# Patient Record
Sex: MALE | Race: WHITE
[De-identification: names, ages, dates, MRNs, and addresses within clinical notes are randomized per-mention and may not be internally consistent; named-entity substitution may affect disease eponyms.]

---

## 2018-05-31 ENCOUNTER — HOSPITAL ENCOUNTER (INPATIENT)
Dept: HOSPITAL 47 - EC | Age: 78
LOS: 2 days | Discharge: HOME | DRG: 684 | End: 2018-06-02
Attending: INTERNAL MEDICINE | Admitting: INTERNAL MEDICINE
Payer: MEDICARE

## 2018-05-31 VITALS — BODY MASS INDEX: 24.7 KG/M2

## 2018-05-31 DIAGNOSIS — J44.9: ICD-10-CM

## 2018-05-31 DIAGNOSIS — N17.0: Primary | ICD-10-CM

## 2018-05-31 DIAGNOSIS — M10.071: ICD-10-CM

## 2018-05-31 DIAGNOSIS — F17.200: ICD-10-CM

## 2018-05-31 DIAGNOSIS — Z71.6: ICD-10-CM

## 2018-05-31 DIAGNOSIS — Z80.9: ICD-10-CM

## 2018-05-31 DIAGNOSIS — M10.072: ICD-10-CM

## 2018-05-31 DIAGNOSIS — I48.91: ICD-10-CM

## 2018-05-31 DIAGNOSIS — I10: ICD-10-CM

## 2018-05-31 DIAGNOSIS — T50.2X5A: ICD-10-CM

## 2018-05-31 DIAGNOSIS — N40.0: ICD-10-CM

## 2018-05-31 DIAGNOSIS — E78.5: ICD-10-CM

## 2018-05-31 LAB
ALBUMIN SERPL-MCNC: 3.9 G/DL (ref 3.5–5)
ALP SERPL-CCNC: 70 U/L (ref 38–126)
ALT SERPL-CCNC: 24 U/L (ref 21–72)
ANION GAP SERPL CALC-SCNC: 14 MMOL/L
APTT BLD: 24.8 SEC (ref 22–30)
AST SERPL-CCNC: 28 U/L (ref 17–59)
BASOPHILS # BLD AUTO: 0 K/UL (ref 0–0.2)
BASOPHILS NFR BLD AUTO: 0 %
BUN SERPL-SCNC: 44 MG/DL (ref 9–20)
CALCIUM SPEC-MCNC: 8.9 MG/DL (ref 8.4–10.2)
CHLORIDE SERPL-SCNC: 97 MMOL/L (ref 98–107)
CK SERPL-CCNC: 321 U/L (ref 55–170)
CO2 SERPL-SCNC: 25 MMOL/L (ref 22–30)
D DIMER PPP FEU-MCNC: 2.41 MG/L FEU (ref ?–0.6)
EOSINOPHIL # BLD AUTO: 0.3 K/UL (ref 0–0.7)
EOSINOPHIL NFR BLD AUTO: 3 %
ERYTHROCYTE [DISTWIDTH] IN BLOOD BY AUTOMATED COUNT: 5.12 M/UL (ref 4.3–5.9)
ERYTHROCYTE [DISTWIDTH] IN BLOOD: 14 % (ref 11.5–15.5)
GLUCOSE SERPL-MCNC: 110 MG/DL (ref 74–99)
HCT VFR BLD AUTO: 44.7 % (ref 39–53)
HGB BLD-MCNC: 15.2 GM/DL (ref 13–17.5)
INR PPP: 1 (ref ?–1.2)
LYMPHOCYTES # SPEC AUTO: 1.6 K/UL (ref 1–4.8)
LYMPHOCYTES NFR SPEC AUTO: 17 %
MAGNESIUM SPEC-SCNC: 2.1 MG/DL (ref 1.6–2.3)
MCH RBC QN AUTO: 29.7 PG (ref 25–35)
MCHC RBC AUTO-ENTMCNC: 34.1 G/DL (ref 31–37)
MCV RBC AUTO: 87.2 FL (ref 80–100)
MONOCYTES # BLD AUTO: 0.9 K/UL (ref 0–1)
MONOCYTES NFR BLD AUTO: 9 %
NEUTROPHILS # BLD AUTO: 6.2 K/UL (ref 1.3–7.7)
NEUTROPHILS NFR BLD AUTO: 68 %
PLATELET # BLD AUTO: 193 K/UL (ref 150–450)
POTASSIUM SERPL-SCNC: 3.9 MMOL/L (ref 3.5–5.1)
PROT SERPL-MCNC: 6.5 G/DL (ref 6.3–8.2)
PT BLD: 10 SEC (ref 9–12)
SODIUM SERPL-SCNC: 136 MMOL/L (ref 137–145)
TROPONIN I SERPL-MCNC: <0.012 NG/ML (ref 0–0.03)
WBC # BLD AUTO: 9.1 K/UL (ref 3.8–10.6)

## 2018-05-31 PROCEDURE — 85610 PROTHROMBIN TIME: CPT

## 2018-05-31 PROCEDURE — 76770 US EXAM ABDO BACK WALL COMP: CPT

## 2018-05-31 PROCEDURE — 80053 COMPREHEN METABOLIC PANEL: CPT

## 2018-05-31 PROCEDURE — 71046 X-RAY EXAM CHEST 2 VIEWS: CPT

## 2018-05-31 PROCEDURE — 85730 THROMBOPLASTIN TIME PARTIAL: CPT

## 2018-05-31 PROCEDURE — 84484 ASSAY OF TROPONIN QUANT: CPT

## 2018-05-31 PROCEDURE — 83880 ASSAY OF NATRIURETIC PEPTIDE: CPT

## 2018-05-31 PROCEDURE — 82550 ASSAY OF CK (CPK): CPT

## 2018-05-31 PROCEDURE — 85027 COMPLETE CBC AUTOMATED: CPT

## 2018-05-31 PROCEDURE — 81003 URINALYSIS AUTO W/O SCOPE: CPT

## 2018-05-31 PROCEDURE — 99285 EMERGENCY DEPT VISIT HI MDM: CPT

## 2018-05-31 PROCEDURE — 94760 N-INVAS EAR/PLS OXIMETRY 1: CPT

## 2018-05-31 PROCEDURE — 85025 COMPLETE CBC W/AUTO DIFF WBC: CPT

## 2018-05-31 PROCEDURE — 36415 COLL VENOUS BLD VENIPUNCTURE: CPT

## 2018-05-31 PROCEDURE — 84550 ASSAY OF BLOOD/URIC ACID: CPT

## 2018-05-31 PROCEDURE — 85379 FIBRIN DEGRADATION QUANT: CPT

## 2018-05-31 PROCEDURE — 93005 ELECTROCARDIOGRAM TRACING: CPT

## 2018-05-31 PROCEDURE — 78582 LUNG VENTILAT&PERFUS IMAGING: CPT

## 2018-05-31 PROCEDURE — 82553 CREATINE MB FRACTION: CPT

## 2018-05-31 PROCEDURE — 83735 ASSAY OF MAGNESIUM: CPT

## 2018-05-31 RX ADMIN — CEFAZOLIN SCH MLS/HR: 330 INJECTION, POWDER, FOR SOLUTION INTRAMUSCULAR; INTRAVENOUS at 23:05

## 2018-05-31 NOTE — ED
Medical Decision Making





- Medical Decision Making





Additional information is currently being treated for acute gout attack is on 

medication.





- Lab Data


Result diagrams: 


 05/31/18 15:11





 05/31/18 15:11





 Lab Results











  05/31/18 05/31/18 05/31/18 Range/Units





  15:11 15:11 15:11 


 


WBC   9.1   (3.8-10.6)  k/uL


 


RBC   5.12   (4.30-5.90)  m/uL


 


Hgb   15.2   (13.0-17.5)  gm/dL


 


Hct   44.7   (39.0-53.0)  %


 


MCV   87.2   (80.0-100.0)  fL


 


MCH   29.7   (25.0-35.0)  pg


 


MCHC   34.1   (31.0-37.0)  g/dL


 


RDW   14.0   (11.5-15.5)  %


 


Plt Count   193   (150-450)  k/uL


 


Neutrophils %   68   %


 


Lymphocytes %   17   %


 


Monocytes %   9   %


 


Eosinophils %   3   %


 


Basophils %   0   %


 


Neutrophils #   6.2   (1.3-7.7)  k/uL


 


Lymphocytes #   1.6   (1.0-4.8)  k/uL


 


Monocytes #   0.9   (0-1.0)  k/uL


 


Eosinophils #   0.3   (0-0.7)  k/uL


 


Basophils #   0.0   (0-0.2)  k/uL


 


PT     (9.0-12.0)  sec


 


INR     (<1.2)  


 


APTT     (22.0-30.0)  sec


 


D-Dimer     (<0.60)  mg/L FEU


 


Sodium    136 L  (137-145)  mmol/L


 


Potassium    3.9  (3.5-5.1)  mmol/L


 


Chloride    97 L  ()  mmol/L


 


Carbon Dioxide    25  (22-30)  mmol/L


 


Anion Gap    14  mmol/L


 


BUN    44 H  (9-20)  mg/dL


 


Creatinine    3.00 H  (0.66-1.25)  mg/dL


 


Est GFR (CKD-EPI)AfAm    22  (>60 ml/min/1.73 sqM)  


 


Est GFR (CKD-EPI)NonAf    19  (>60 ml/min/1.73 sqM)  


 


Glucose    110 H  (74-99)  mg/dL


 


Calcium    8.9  (8.4-10.2)  mg/dL


 


Magnesium    2.1  (1.6-2.3)  mg/dL


 


Total Bilirubin    0.6  (0.2-1.3)  mg/dL


 


AST    28  (17-59)  U/L


 


ALT    24  (21-72)  U/L


 


Alkaline Phosphatase    70  ()  U/L


 


Total Creatine Kinase  321 H    ()  U/L


 


CK-MB (CK-2)  7.0 H*    (0.0-2.4)  ng/mL


 


CK-MB (CK-2) Rel Index  2.2    


 


Troponin I  <0.012    (0.000-0.034)  ng/mL


 


NT-Pro-B Natriuret Pep     pg/mL


 


Total Protein    6.5  (6.3-8.2)  g/dL


 


Albumin    3.9  (3.5-5.0)  g/dL














  05/31/18 05/31/18 Range/Units





  15:11 15:11 


 


WBC    (3.8-10.6)  k/uL


 


RBC    (4.30-5.90)  m/uL


 


Hgb    (13.0-17.5)  gm/dL


 


Hct    (39.0-53.0)  %


 


MCV    (80.0-100.0)  fL


 


MCH    (25.0-35.0)  pg


 


MCHC    (31.0-37.0)  g/dL


 


RDW    (11.5-15.5)  %


 


Plt Count    (150-450)  k/uL


 


Neutrophils %    %


 


Lymphocytes %    %


 


Monocytes %    %


 


Eosinophils %    %


 


Basophils %    %


 


Neutrophils #    (1.3-7.7)  k/uL


 


Lymphocytes #    (1.0-4.8)  k/uL


 


Monocytes #    (0-1.0)  k/uL


 


Eosinophils #    (0-0.7)  k/uL


 


Basophils #    (0-0.2)  k/uL


 


PT  10.0   (9.0-12.0)  sec


 


INR  1.0   (<1.2)  


 


APTT  24.8   (22.0-30.0)  sec


 


D-Dimer  2.41 H   (<0.60)  mg/L FEU


 


Sodium    (137-145)  mmol/L


 


Potassium    (3.5-5.1)  mmol/L


 


Chloride    ()  mmol/L


 


Carbon Dioxide    (22-30)  mmol/L


 


Anion Gap    mmol/L


 


BUN    (9-20)  mg/dL


 


Creatinine    (0.66-1.25)  mg/dL


 


Est GFR (CKD-EPI)AfAm    (>60 ml/min/1.73 sqM)  


 


Est GFR (CKD-EPI)NonAf    (>60 ml/min/1.73 sqM)  


 


Glucose    (74-99)  mg/dL


 


Calcium    (8.4-10.2)  mg/dL


 


Magnesium    (1.6-2.3)  mg/dL


 


Total Bilirubin    (0.2-1.3)  mg/dL


 


AST    (17-59)  U/L


 


ALT    (21-72)  U/L


 


Alkaline Phosphatase    ()  U/L


 


Total Creatine Kinase    ()  U/L


 


CK-MB (CK-2)    (0.0-2.4)  ng/mL


 


CK-MB (CK-2) Rel Index    


 


Troponin I    (0.000-0.034)  ng/mL


 


NT-Pro-B Natriuret Pep   2270  pg/mL


 


Total Protein    (6.3-8.2)  g/dL


 


Albumin    (3.5-5.0)  g/dL














Disposition


Clinical Impression: 


 Acute renal failure (ARF), Hypotensive episode, Dyspnea





Disposition: ADMITTED AS IP TO THIS HOSP


Condition: Stable


Referrals: 


[facility] [Primary Care Provider] - 1-2 days

## 2018-05-31 NOTE — ED
General Adult HPI





- General


Source: patient, EMS, RN notes reviewed, old records reviewed


Mode of arrival: EMS


Limitations: no limitations





<Kurt Huber - Last Filed: 05/31/18 17:08>





<Kurt Rodriguez - Last Filed: 05/31/18 22:36>





- General


Chief complaint: Shortness of Breath


Stated complaint: DENISHA, AFIB


Time Seen by Provider: 05/31/18 14:58





- History of Present Illness


Initial comments: 





78-year-old male presenting for episode of dyspnea.  Patient was transported by 

EMS.  He had a single episode of severe dyspnea which lasted less than a 

minute.  He states he could not take a breath.  He denies any chest pain with 

this.  No neck pain or arm pain.  No abdominal pain.  No diaphoresis or 

vomiting.  Episode resolved without treatment.  EMS reports stable vitals 

during transport.  He did have a second episode while being transported by EMS 

this again lasted seconds and was not accompanied by pain.  EMS report no 

change in vitals during the episode.  Patient has no known history of coronary 

artery disease.  No history of DVT or PE.   (Kurt Huber)





- Related Data


 Home Medications











 Medication  Instructions  Recorded  Confirmed


 


Allopurinol [Zyloprim] 300 mg PO DAILY 05/31/18 05/31/18


 


Atenolol 25 mg PO BID 05/31/18 05/31/18


 


Colchicine 0.6 mg PO TID PRN 05/31/18 05/31/18


 


Ergocalciferol [Vitamin D2] 50,000 unit PO SA 05/31/18 05/31/18


 


Hydrochlorothiazide [Hydrodiuril] 50 mg PO DAILY 05/31/18 05/31/18


 


Lisinopril [Zestril] 5 mg PO DAILY 05/31/18 05/31/18


 


Ranitidine HCl 150 mg PO BID 05/31/18 05/31/18


 


Tamsulosin HCl [Flomax] 0.4 mg PO DAILY 05/31/18 05/31/18


 


amLODIPine BESYLATE [Norvasc] 10 mg PO DAILY 05/31/18 05/31/18


 


traMADol HCL [Ultram] 50 mg PO Q6HR PRN 05/31/18 05/31/18











 Allergies











Allergy/AdvReac Type Severity Reaction Status Date / Time


 


No Known Allergies Allergy   Verified 05/31/18 15:33














Review of Systems


ROS Other: All systems not noted in ROS Statement are negative.





<FroydominiqueKurt MINNIE - Last Filed: 05/31/18 17:08>


ROS Other: All systems not noted in ROS Statement are negative.





<Kurt Rodriguez - Last Filed: 05/31/18 22:36>


ROS Statement: 


Those systems with pertinent positive or pertinent negative responses have been 

documented in the HPI.








Past Medical History


Past Medical History: Hyperlipidemia, Hypertension, Prostate Disorder


History of Any Multi-Drug Resistant Organisms: None Reported


Past Surgical History: Back Surgery


Smoking Status: Current every day smoker


Past Alcohol Use History: None Reported


Past Drug Use History: None Reported





<FroybrunoshantanuKurt MINNIE - Last Filed: 05/31/18 17:08>





General Exam


Limitations: no limitations


General appearance: alert, in no apparent distress


Head exam: Present: atraumatic, normocephalic


Eye exam: Present: normal appearance, PERRL


ENT exam: Present: normal exam


Neck exam: Present: normal inspection.  Absent: tenderness, meningismus


Respiratory exam: Present: normal lung sounds bilaterally.  Absent: respiratory 

distress, wheezes, rales


Cardiovascular Exam: Present: regular rate, normal rhythm


GI/Abdominal exam: Present: soft.  Absent: distended, tenderness


Extremities exam: Present: normal inspection, normal capillary refill.  Absent: 

pedal edema, calf tenderness


Neurological exam: Present: alert, oriented X3.  Absent: motor sensory deficit


Psychiatric exam: Present: normal affect, normal mood


Skin exam: Present: warm, dry, intact.  Absent: cyanosis, diaphoretic





<FroyKurt fox N - Last Filed: 05/31/18 17:08>





Course





<FroyKurt fox N - Last Filed: 05/31/18 17:08>





<Kurt Rodriguez - Last Filed: 05/31/18 22:36>


 Vital Signs











  05/31/18 05/31/18 05/31/18





  14:58 16:05 17:35


 


Temperature 97.3 F L  


 


Pulse Rate 75 79 65


 


Respiratory 18 18 18





Rate   


 


Blood Pressure 93/55 109/52 93/55


 


O2 Sat by Pulse 93 L 96 98





Oximetry   














  05/31/18 05/31/18 05/31/18





  18:12 19:14 21:17


 


Temperature  97.9 F 


 


Pulse Rate 59 L 58 L 


 


Respiratory 18 16 18





Rate   


 


Blood Pressure 98/57 114/51 


 


O2 Sat by Pulse 98 97 





Oximetry   














  05/31/18





  21:29


 


Temperature 


 


Pulse Rate 72


 


Respiratory 16





Rate 


 


Blood Pressure 


 


O2 Sat by Pulse 96





Oximetry 














- Reevaluation(s)


Reevaluation #1: 





05/31/18 22:32


I did reevaluate the patient several occasions the VQ scan was negative for 

evidence of PE.  I did discuss the findings with the patient has wife patient 

will be admitted he has have evidence of renal failure hypotension and episodes 

of dyspnea (Kurt Rodriguez)





EKG Findings





- EKG Comments:


EKG Findings:: EKG: Atrial fibrillation, left axis deviation, incomplete right 

bundle, rate of 76, QRS duration 108, 





<Kurt Huber - Last Filed: 05/31/18 17:08>





Medical Decision Making





- Lab Data


Result diagrams: 


 05/31/18 15:11





 05/31/18 15:11





<Kurt Huber - Last Filed: 05/31/18 17:08>





- Lab Data


Result diagrams: 


 05/31/18 15:11





 05/31/18 15:11





<Kurt Rodriguez - Last Filed: 05/31/18 22:36>





- Medical Decision Making





78-year-old male presenting with episodic dyspnea.  Labs reveal normal white 

blood cell count, stable hemoglobin, d-dimer is elevated at 2.4, creatinine is 

also elevated 3.1 with no known baseline.  Patient will receive a VQ scan for 

evaluation of pulmonary embolism given the elevated d-dimer.  Patient's care 

will be signed out to Dr. Rodriguez at shift change awaiting VQ scan and final 

disposition. (Kurt Huber)





- Lab Data


 Lab Results











  05/31/18 05/31/18 05/31/18 Range/Units





  15:11 15:11 15:11 


 


WBC   9.1   (3.8-10.6)  k/uL


 


RBC   5.12   (4.30-5.90)  m/uL


 


Hgb   15.2   (13.0-17.5)  gm/dL


 


Hct   44.7   (39.0-53.0)  %


 


MCV   87.2   (80.0-100.0)  fL


 


MCH   29.7   (25.0-35.0)  pg


 


MCHC   34.1   (31.0-37.0)  g/dL


 


RDW   14.0   (11.5-15.5)  %


 


Plt Count   193   (150-450)  k/uL


 


Neutrophils %   68   %


 


Lymphocytes %   17   %


 


Monocytes %   9   %


 


Eosinophils %   3   %


 


Basophils %   0   %


 


Neutrophils #   6.2   (1.3-7.7)  k/uL


 


Lymphocytes #   1.6   (1.0-4.8)  k/uL


 


Monocytes #   0.9   (0-1.0)  k/uL


 


Eosinophils #   0.3   (0-0.7)  k/uL


 


Basophils #   0.0   (0-0.2)  k/uL


 


PT     (9.0-12.0)  sec


 


INR     (<1.2)  


 


APTT     (22.0-30.0)  sec


 


D-Dimer     (<0.60)  mg/L FEU


 


Sodium    136 L  (137-145)  mmol/L


 


Potassium    3.9  (3.5-5.1)  mmol/L


 


Chloride    97 L  ()  mmol/L


 


Carbon Dioxide    25  (22-30)  mmol/L


 


Anion Gap    14  mmol/L


 


BUN    44 H  (9-20)  mg/dL


 


Creatinine    3.00 H  (0.66-1.25)  mg/dL


 


Est GFR (CKD-EPI)AfAm    22  (>60 ml/min/1.73 sqM)  


 


Est GFR (CKD-EPI)NonAf    19  (>60 ml/min/1.73 sqM)  


 


Glucose    110 H  (74-99)  mg/dL


 


Calcium    8.9  (8.4-10.2)  mg/dL


 


Magnesium    2.1  (1.6-2.3)  mg/dL


 


Total Bilirubin    0.6  (0.2-1.3)  mg/dL


 


AST    28  (17-59)  U/L


 


ALT    24  (21-72)  U/L


 


Alkaline Phosphatase    70  ()  U/L


 


Total Creatine Kinase  321 H    ()  U/L


 


CK-MB (CK-2)  7.0 H*    (0.0-2.4)  ng/mL


 


CK-MB (CK-2) Rel Index  2.2    


 


Troponin I  <0.012    (0.000-0.034)  ng/mL


 


NT-Pro-B Natriuret Pep     pg/mL


 


Total Protein    6.5  (6.3-8.2)  g/dL


 


Albumin    3.9  (3.5-5.0)  g/dL














  05/31/18 05/31/18 Range/Units





  15:11 15:11 


 


WBC    (3.8-10.6)  k/uL


 


RBC    (4.30-5.90)  m/uL


 


Hgb    (13.0-17.5)  gm/dL


 


Hct    (39.0-53.0)  %


 


MCV    (80.0-100.0)  fL


 


MCH    (25.0-35.0)  pg


 


MCHC    (31.0-37.0)  g/dL


 


RDW    (11.5-15.5)  %


 


Plt Count    (150-450)  k/uL


 


Neutrophils %    %


 


Lymphocytes %    %


 


Monocytes %    %


 


Eosinophils %    %


 


Basophils %    %


 


Neutrophils #    (1.3-7.7)  k/uL


 


Lymphocytes #    (1.0-4.8)  k/uL


 


Monocytes #    (0-1.0)  k/uL


 


Eosinophils #    (0-0.7)  k/uL


 


Basophils #    (0-0.2)  k/uL


 


PT  10.0   (9.0-12.0)  sec


 


INR  1.0   (<1.2)  


 


APTT  24.8   (22.0-30.0)  sec


 


D-Dimer  2.41 H   (<0.60)  mg/L FEU


 


Sodium    (137-145)  mmol/L


 


Potassium    (3.5-5.1)  mmol/L


 


Chloride    ()  mmol/L


 


Carbon Dioxide    (22-30)  mmol/L


 


Anion Gap    mmol/L


 


BUN    (9-20)  mg/dL


 


Creatinine    (0.66-1.25)  mg/dL


 


Est GFR (CKD-EPI)AfAm    (>60 ml/min/1.73 sqM)  


 


Est GFR (CKD-EPI)NonAf    (>60 ml/min/1.73 sqM)  


 


Glucose    (74-99)  mg/dL


 


Calcium    (8.4-10.2)  mg/dL


 


Magnesium    (1.6-2.3)  mg/dL


 


Total Bilirubin    (0.2-1.3)  mg/dL


 


AST    (17-59)  U/L


 


ALT    (21-72)  U/L


 


Alkaline Phosphatase    ()  U/L


 


Total Creatine Kinase    ()  U/L


 


CK-MB (CK-2)    (0.0-2.4)  ng/mL


 


CK-MB (CK-2) Rel Index    


 


Troponin I    (0.000-0.034)  ng/mL


 


NT-Pro-B Natriuret Pep   2270  pg/mL


 


Total Protein    (6.3-8.2)  g/dL


 


Albumin    (3.5-5.0)  g/dL














Disposition





<Kurt Huber - Last Filed: 05/31/18 17:08>





<Kurt Rodriguez - Last Filed: 05/31/18 22:36>


Clinical Impression: 


 Acute renal failure (ARF), Hypotensive episode, Dyspnea





Disposition: ADMITTED AS IP TO THIS HOSP


Condition: Stable


Referrals: 


[facility]inic [Primary Care Provider] - 1-2 days

## 2018-05-31 NOTE — NM
EXAMINATION TYPE: NM pul vent and perfuse

 

DATE OF EXAM: 5/31/2018

 

COMPARISON: Chest radiographs 5/31/2018 at 3:46 PM

 

HISTORY: Dyspnea, elevated d-dimer

 

TECHNIQUE:  Utilizing inhalation of 68.8 mCi Tc 99m DTPA aerosol and intravenous injection of 5.2 mCi
 of Tc 99m MAA, ventilation and perfusion images are acquired post injection in multiple projections.


 

FINDINGS: 

The radiotracer distribution is noted to be mildly heterogeneous throughout the lung on both the vent
ilation multiplanar images and the multiplanar perfusion images, on the left greater than the right. 


 

However, there is no evidence of mismatched defects.

 

IMPRESSION: 

Low probability for the diagnosis of pulmonary embolism.

## 2018-05-31 NOTE — XR
EXAMINATION TYPE: XR chest 2V

 

DATE OF EXAM: 5/31/2018

 

COMPARISON: None

 

HISTORY: Severe shortness of breath and hypertension

 

TECHNIQUE:  Frontal and lateral views of the chest are obtained.

 

FINDINGS:  There is flattening of the diaphragms and pulmonary hyperinflation compatible with underly
ing COPD. Anterior bridging osteophytes are seen that may relate to degenerative change or diffuse id
iopathic skeletal hyperostosis within the thoracic spine. Cardiomediastinal silhouette is upper limit
s of normal size. No focal consolidation or pleural effusion is seen. No pneumothorax. Moderate acrom
ioclavicular arthropathy.

 

IMPRESSION:  No acute cardiopulmonary process. Radiographic sequela of COPD.

## 2018-06-01 VITALS — TEMPERATURE: 97.9 F

## 2018-06-01 VITALS — RESPIRATION RATE: 16 BRPM

## 2018-06-01 LAB
ALBUMIN SERPL-MCNC: 3.8 G/DL (ref 3.5–5)
ALP SERPL-CCNC: 77 U/L (ref 38–126)
ALT SERPL-CCNC: 25 U/L (ref 21–72)
ANION GAP SERPL CALC-SCNC: 16 MMOL/L
AST SERPL-CCNC: 26 U/L (ref 17–59)
BASOPHILS # BLD AUTO: 0 K/UL (ref 0–0.2)
BASOPHILS NFR BLD AUTO: 0 %
BUN SERPL-SCNC: 51 MG/DL (ref 9–20)
CALCIUM SPEC-MCNC: 9 MG/DL (ref 8.4–10.2)
CHLORIDE SERPL-SCNC: 101 MMOL/L (ref 98–107)
CO2 SERPL-SCNC: 23 MMOL/L (ref 22–30)
EOSINOPHIL # BLD AUTO: 0.3 K/UL (ref 0–0.7)
EOSINOPHIL NFR BLD AUTO: 3 %
ERYTHROCYTE [DISTWIDTH] IN BLOOD BY AUTOMATED COUNT: 5.1 M/UL (ref 4.3–5.9)
ERYTHROCYTE [DISTWIDTH] IN BLOOD: 13.8 % (ref 11.5–15.5)
GLUCOSE SERPL-MCNC: 107 MG/DL (ref 74–99)
HCT VFR BLD AUTO: 45.4 % (ref 39–53)
HGB BLD-MCNC: 15.1 GM/DL (ref 13–17.5)
LYMPHOCYTES # SPEC AUTO: 1.8 K/UL (ref 1–4.8)
LYMPHOCYTES NFR SPEC AUTO: 22 %
MCH RBC QN AUTO: 29.5 PG (ref 25–35)
MCHC RBC AUTO-ENTMCNC: 33.2 G/DL (ref 31–37)
MCV RBC AUTO: 89 FL (ref 80–100)
MONOCYTES # BLD AUTO: 0.6 K/UL (ref 0–1)
MONOCYTES NFR BLD AUTO: 7 %
NEUTROPHILS # BLD AUTO: 5.6 K/UL (ref 1.3–7.7)
NEUTROPHILS NFR BLD AUTO: 66 %
PH UR: 5.5 [PH] (ref 5–8)
PLATELET # BLD AUTO: 199 K/UL (ref 150–450)
POTASSIUM SERPL-SCNC: 3.6 MMOL/L (ref 3.5–5.1)
PROT SERPL-MCNC: 6.4 G/DL (ref 6.3–8.2)
SODIUM SERPL-SCNC: 140 MMOL/L (ref 137–145)
SP GR UR: 1 (ref 1–1.03)
URATE SERPL-MCNC: 6.1 MG/DL (ref 3.5–8.5)
UROBILINOGEN UR QL STRIP: <2 MG/DL (ref ?–2)
WBC # BLD AUTO: 8.4 K/UL (ref 3.8–10.6)

## 2018-06-01 RX ADMIN — ATENOLOL SCH MG: 25 TABLET ORAL at 20:21

## 2018-06-01 RX ADMIN — COLCHICINE SCH MG: 0.6 TABLET, FILM COATED ORAL at 16:50

## 2018-06-01 RX ADMIN — CEFAZOLIN SCH MLS/HR: 330 INJECTION, POWDER, FOR SOLUTION INTRAMUSCULAR; INTRAVENOUS at 20:20

## 2018-06-01 RX ADMIN — CEFAZOLIN SCH MLS/HR: 330 INJECTION, POWDER, FOR SOLUTION INTRAMUSCULAR; INTRAVENOUS at 16:50

## 2018-06-01 RX ADMIN — COLCHICINE SCH MG: 0.6 TABLET, FILM COATED ORAL at 08:19

## 2018-06-01 RX ADMIN — ATENOLOL SCH MG: 25 TABLET ORAL at 08:19

## 2018-06-01 RX ADMIN — ALLOPURINOL SCH MG: 300 TABLET ORAL at 08:19

## 2018-06-01 RX ADMIN — TAMSULOSIN HYDROCHLORIDE SCH MG: 0.4 CAPSULE ORAL at 08:19

## 2018-06-01 RX ADMIN — COLCHICINE SCH MG: 0.6 TABLET, FILM COATED ORAL at 20:21

## 2018-06-01 NOTE — US
EXAMINATION TYPE: US kidneys/renal and bladder

 

DATE OF EXAM: 6/1/2018

 

COMPARISON: NONE

 

CLINICAL HISTORY: suze. back pain, no renal history per patient

 

EXAM MEASUREMENTS:

 

Right Kidney:  12.8 x 5.1 x 6.0 cm

Left Kidney: 12.0 x 5.0 x 7.0 cm

 

 

 

Right Kidney: multiple cysts seen, largest = 2.0cm on inferior pole   

Left Kidney: 4.9cm irregular walled cyst with septation noted at inferior pole  

Bladder: wnl

**Bilateral Jets seen: not seen

 

 

There is no evidence for hydronephrosis at this point in time.  No nephrolithiasis is seen.   The uri
nary bladder is anechoic.  Bilateral ureteral jets are not seen.

 

A few small simple appearing cysts are scattered throughout the right kidney on images saved. There i
s lobulated cyst with thin septation or 2 adjacent cysts in the left kidney marked towards end of chelly
dy by technologist.

 

IMPRESSION:

No hydronephrosis is evident bilaterally.

## 2018-06-01 NOTE — P.NPCON
History of Present Illness





- Reason for Consult


acute renal failure





- History of Present Illness





Reason for consultation: Acute kidney injury





History of present illness:


Patient is a 78-year-old male seen in renal consultation for acute kidney 

injury.  Unclear as to what his base in renal function is.  Creatinine was 3.0 

on admission and is down to 2.88 today.  He is currently maintained on normal 

saline at 80 mL an hour.  He was taking hydrochlorothiazide and lisinopril as 

an outpatient.  Lisinopril is held however he did receive hydrochlorothiazide 

this morning.  Patient presented to the hospital with dyspnea.  VQ scan 

revealed low probability of PE.  Chest x-ray revealed no evidence of fluid 

overload.  Patient's also being treated for acute gout which started last 

Sunday.  His oral intake over the last week has been quite poor.  He admits to 

good urine output.  Denies hematuria or dysuria.  Denies chest pain.  Dyspnea 

is overall improved.  Hemodynamically stable.  Denies any family history of 

kidney disease.  Denies vomiting or diarrhea.  He denies use of NSAIDs.





Vital signs are stable.


General: The patient appeared well nourished and normally developed. 


HEENT: Head exam is unremarkable. Neck is without jugular venous distension.


LUNGS: Lungs are clear to auscultation and percussion. Breath sounds decreased.


HEART: Rate and Rhythm are regular. First and second heart sounds normal. No 

murmurs, rubs or gallops. 


ABDOMEN: Abdominal exam reveals normal bowel sounds. Non-tender and non-

distended. No evidence of peritonitis.


EXTREMITITES: No clubbing, cyanosis, or edema.  Erythema and ankles noted.  No 

obvious drainage.





Past Medical History


Past Medical History: Atrial Fibrillation, COPD, Hyperlipidemia, Hypertension, 

Prostate Disorder, Renal Disease


Additional Past Medical History / Comment(s): gout, PSA elevated, acute renal 

failure since 4/2018


History of Any Multi-Drug Resistant Organisms: None Reported


Past Surgical History: Back Surgery


Additional Past Surgical History / Comment(s): 4 back surgeries


Past Anesthesia/Blood Transfusion Reactions: No Reported Reaction


Past Psychological History: No Psychological Hx Reported


Smoking Status: Current every day smoker


Past Alcohol Use History: None Reported


Past Drug Use History: None Reported





- Past Family History


  ** Mother


Family Medical History: Cancer


Additional Family Medical History / Comment(s): cirriosis





  ** Father


Family Medical History: Cancer





Medications and Allergies


 Home Medications











 Medication  Instructions  Recorded  Confirmed  Type


 


Allopurinol [Zyloprim] 300 mg PO DAILY 05/31/18 05/31/18 History


 


Atenolol 25 mg PO BID 05/31/18 05/31/18 History


 


Colchicine 0.6 mg PO TID PRN 05/31/18 05/31/18 History


 


Ergocalciferol [Vitamin D2] 50,000 unit PO SA 05/31/18 05/31/18 History


 


Hydrochlorothiazide [Hydrodiuril] 50 mg PO DAILY 05/31/18 05/31/18 History


 


Lisinopril [Zestril] 5 mg PO DAILY 05/31/18 05/31/18 History


 


Ranitidine HCl 150 mg PO BID 05/31/18 05/31/18 History


 


Tamsulosin HCl [Flomax] 0.4 mg PO DAILY 05/31/18 05/31/18 History


 


amLODIPine BESYLATE [Norvasc] 10 mg PO DAILY 05/31/18 05/31/18 History


 


traMADol HCL [Ultram] 50 mg PO Q6HR PRN 05/31/18 05/31/18 History











 Allergies











Allergy/AdvReac Type Severity Reaction Status Date / Time


 


No Known Allergies Allergy   Verified 05/31/18 15:33














Physical Exam


Vitals: 


 Vital Signs











  Temp Pulse Pulse Pulse Resp BP BP


 


 06/01/18 07:26       


 


 06/01/18 05:47  97.8 F   75   18   130/62


 


 06/01/18 00:00      18  


 


 05/31/18 23:42  97.5 F L    79  18   132/62


 


 05/31/18 23:10  98.3 F  63    16  110/68 


 


 05/31/18 21:29   72    16  


 


 05/31/18 21:17      18  


 


 05/31/18 19:14  97.9 F  58 L    16  114/51 


 


 05/31/18 18:12   59 L    18  98/57 


 


 05/31/18 17:35   65    18  93/55 


 


 05/31/18 16:05   79    18  109/52 


 


 05/31/18 14:58  97.3 F L  75    18  93/55 














  Pulse Ox


 


 06/01/18 07:26  95


 


 06/01/18 05:47  95


 


 06/01/18 00:00 


 


 05/31/18 23:42  98


 


 05/31/18 23:10  96


 


 05/31/18 21:29  96


 


 05/31/18 21:17 


 


 05/31/18 19:14  97


 


 05/31/18 18:12  98


 


 05/31/18 17:35  98


 


 05/31/18 16:05  96


 


 05/31/18 14:58  93 L








 Intake and Output











 05/31/18 06/01/18 06/01/18





 22:59 06:59 14:59


 


Other:   


 


  Voiding Method  Toilet Toilet





  Urinal Urinal


 


  # Voids  2 


 


  Weight  80.5 kg 














Results





- Lab Results


 Most recent lab results











Calcium  9.0 mg/dL (8.4-10.2)   06/01/18  07:50    


 


Magnesium  2.1 mg/dL (1.6-2.3)   05/31/18  15:11    














 06/01/18 07:50





 06/01/18 07:50





Assessment and Plan


Plan: 





Assessment:


1.  Nonoliguric acute kidney injury secondary to ATN secondary to poor oral 

intake and further worsened with the use of lisinopril and diuretics.  

Creatinine was 3 and admission and is down to 2.88 today.  Unknown baseline 

creatinine.


2.  Acute gout maintained on colchicine.


3.  Benign hypertension.  Controlled.


4.  BPH maintained on Flomax.





Plan:


Maintain normal saline at 80 mL an hour.


Continue to hold lisinopril.


I will discontinue hydrochlorothiazide for now.


Check urinalysis.


Check renal ultrasound.





Thank you for the consultation.  I will continue to follow the patient with you 

during his hospital stay.

## 2018-06-01 NOTE — P.HPIM
History of Present Illness


78-year-old gentleman came to ER with the comments of shortness of breath 

patient is found to have elevated creatinine because of which patient was 

admitted.  His baseline creatinine is unknown his creatinine is 3.0 here and 

patient was having gout because of which patient is on colchicine for that.  

Patient did not take any NSAID patient doesn't know if he had chronic kidney 

disease.  VQ scan showed low probability PE chest x-ray did not show any fluid 

overload clinically patient does not appear to have any COPD exacerbation.  

Although patient used to smoke more than a pack a day cutdown to 1 pack a day 

now.  May have had COPD and bronchospasm which is not present now.  Patient's 

creatinine with IV fluids as come down from 3.0-2.88 patient is being continued 

on IV fluids kidney ultrasound is being obtained nephrology was consulted.  He  

denied any dyspnea at this time.  He denied any cough fever chills doesn't have 

any pneumonia as was chest x-ray or clinically





Review of Systems


REVIEW OF SYSTEMS: 


CONSTITUTIONAL: No fever, no malaise, no fatigue. 


HEENT: No recent visual problems or hearing problems. Denied any sore throat. 


CARDIOVASCULAR: No chest pain, orthopnea, PND, no palpitations, no syncope. 


PULMONARY, no hemoptysis. 


GASTROINTESTINAL: No diarrhea, no nausea, no vomiting, no abdominal pain. 

Normoactive bowel sounds. 


NEUROLOGICAL: No headaches, no weakness, no numbness. 


HEMATOLOGICAL: Denies any bleeding or petechiae. 


GENITOURINARY: Denies any burning micturition, frequency, or urgency. 


MUSCULOSKELETAL/RHEUMATOLOGICAL: Denies any joint pain, swelling, or any muscle 

pain. 


ENDOCRINE: Denies any polyuria or polydipsia. 





The rest of the 14-point review of systems is negative.











Past Medical History


Past Medical History: Atrial Fibrillation, COPD, Hyperlipidemia, Hypertension, 

Prostate Disorder, Renal Disease


Additional Past Medical History / Comment(s): gout, PSA elevated, acute renal 

failure since 4/2018


History of Any Multi-Drug Resistant Organisms: None Reported


Past Surgical History: Back Surgery


Additional Past Surgical History / Comment(s): 4 back surgeries


Past Anesthesia/Blood Transfusion Reactions: No Reported Reaction


Past Psychological History: No Psychological Hx Reported


Smoking Status: Current every day smoker


Past Alcohol Use History: None Reported


Past Drug Use History: None Reported





- Past Family History


  ** Mother


Family Medical History: Cancer


Additional Family Medical History / Comment(s): cirriosis





  ** Father


Family Medical History: Cancer





Medications and Allergies


 Home Medications











 Medication  Instructions  Recorded  Confirmed  Type


 


Allopurinol [Zyloprim] 300 mg PO DAILY 05/31/18 05/31/18 History


 


Atenolol 25 mg PO BID 05/31/18 05/31/18 History


 


Colchicine 0.6 mg PO TID PRN 05/31/18 05/31/18 History


 


Ergocalciferol [Vitamin D2] 50,000 unit PO SA 05/31/18 05/31/18 History


 


Hydrochlorothiazide [Hydrodiuril] 50 mg PO DAILY 05/31/18 05/31/18 History


 


Lisinopril [Zestril] 5 mg PO DAILY 05/31/18 05/31/18 History


 


Ranitidine HCl 150 mg PO BID 05/31/18 05/31/18 History


 


Tamsulosin HCl [Flomax] 0.4 mg PO DAILY 05/31/18 05/31/18 History


 


amLODIPine BESYLATE [Norvasc] 10 mg PO DAILY 05/31/18 05/31/18 History


 


traMADol HCL [Ultram] 50 mg PO Q6HR PRN 05/31/18 05/31/18 History











 Allergies











Allergy/AdvReac Type Severity Reaction Status Date / Time


 


No Known Allergies Allergy   Verified 05/31/18 15:33














Physical Exam


Vitals: 


 Vital Signs











  Temp Pulse Pulse Pulse Resp BP BP


 


 06/01/18 14:56  97.8 F    81  16   112/60


 


 06/01/18 07:26       


 


 06/01/18 05:47  97.8 F   75   18   130/62


 


 06/01/18 00:00      18  


 


 05/31/18 23:42  97.5 F L    79  18   132/62


 


 05/31/18 23:10  98.3 F  63    16  110/68 


 


 05/31/18 21:29   72    16  


 


 05/31/18 21:17      18  


 


 05/31/18 19:14  97.9 F  58 L    16  114/51 


 


 05/31/18 18:12   59 L    18  98/57 


 


 05/31/18 17:35   65    18  93/55 














  Pulse Ox


 


 06/01/18 14:56  95


 


 06/01/18 07:26  95


 


 06/01/18 05:47  95


 


 06/01/18 00:00 


 


 05/31/18 23:42  98


 


 05/31/18 23:10  96


 


 05/31/18 21:29  96


 


 05/31/18 21:17 


 


 05/31/18 19:14  97


 


 05/31/18 18:12  98


 


 05/31/18 17:35  98








 Intake and Output











 06/01/18 06/01/18 06/01/18





 06:59 14:59 22:59


 


Intake Total  400 


 


Output Total  700 


 


Balance  -300 


 


Intake:   


 


  Oral  400 


 


Output:   


 


  Urine  700 


 


Other:   


 


  Voiding Method Toilet Toilet Toilet





 Urinal Urinal Urinal


 


  # Voids 2  


 


  Weight 80.5 kg  











PHYSICAL EXAMINATION: 





GENERAL: The patient is alert and oriented x3, not in any acute distress. Well 

developed, well nourished. 


HEENT: Pupils are round and equally reacting to light. EOMI. No scleral 

icterus. No conjunctival pallor. Normocephalic, atraumatic. No pharyngeal 

erythema. No thyromegaly. 


CARDIOVASCULAR: S1 and S2 present. No murmurs, rubs, or gallops. 


PULMONARY: Chest is clear to auscultation, no wheezing or crackles. 


ABDOMEN: Soft, nontender, nondistended, normoactive bowel sounds. No palpable 

organomegaly. 


MUSCULOSKELETAL: No joint swelling or deformity.


EXTREMITIES: No cyanosis, clubbing, or pedal edema.  Patient does have gouty 

arthritis of the left great toe in bilateral ankles pain and tenderness did 

improve


NEUROLOGICAL: Gross neurological examination did not reveal any focal deficits. 


SKIN: No rashes. 











Results


CBC & Chem 7: 


 06/01/18 07:50





 06/01/18 07:50


Labs: 


 Abnormal Lab Results - Last 24 Hours (Table)











  06/01/18 Range/Units





  07:50 


 


BUN  51 H  (9-20)  mg/dL


 


Creatinine  2.88 H  (0.66-1.25)  mg/dL


 


Glucose  107 H  (74-99)  mg/dL














Thrombosis Risk Factor Assmnt





- Choose All That Apply


Any of the Below Risk Factors Present?: Yes


Each Factor Represents 1 point: Swollen legs (current)


Other Risk Factors: No


Thrombosis Risk Factor Assessment Total Risk Factor Score: 1


Thrombosis Risk Factor Assessment Level: Low Risk





Assessment and Plan


Plan: 


-Shortness of breath: Etiology unclear rule out pulmonary embolism may have had 

on and off COPD counseling regarding smoking cessation was provided patient 

will be discharged on albuterol patient may need pulmonary function testing.  

Patient will not require systemic steroids at this time.


-Renal failure: Unsure whether patient has acute renal failure on chronic 

kidney disease patient may have a competent of both IV fluids will be increased 

to 200 mL/h monitor creatinine elevating ultrasound of the kidney and further 

workup for renal failure


-Acute gouty arthritis: Continue with colchicine


-Benign prostatic atrophy


-Rule out pulmonary embolism


-Hypertension: Nephrotoxic agents including ACE inhibitor was discontinued 

patient is being continued on atenolol I'm cutting down amlodipine because of 

low normal blood pressures and hypotension can cause kidney dysfunction, 

hydrochlorothiazide is being discontinued

## 2018-06-02 VITALS — HEART RATE: 81 BPM | SYSTOLIC BLOOD PRESSURE: 128 MMHG | DIASTOLIC BLOOD PRESSURE: 62 MMHG

## 2018-06-02 LAB
ALBUMIN SERPL-MCNC: 3.4 G/DL (ref 3.5–5)
ALP SERPL-CCNC: 71 U/L (ref 38–126)
ALT SERPL-CCNC: 22 U/L (ref 21–72)
ANION GAP SERPL CALC-SCNC: 13 MMOL/L
AST SERPL-CCNC: 20 U/L (ref 17–59)
BUN SERPL-SCNC: 40 MG/DL (ref 9–20)
CALCIUM SPEC-MCNC: 9.1 MG/DL (ref 8.4–10.2)
CHLORIDE SERPL-SCNC: 107 MMOL/L (ref 98–107)
CO2 SERPL-SCNC: 26 MMOL/L (ref 22–30)
ERYTHROCYTE [DISTWIDTH] IN BLOOD BY AUTOMATED COUNT: 4.92 M/UL (ref 4.3–5.9)
ERYTHROCYTE [DISTWIDTH] IN BLOOD: 13.8 % (ref 11.5–15.5)
GLUCOSE SERPL-MCNC: 156 MG/DL (ref 74–99)
HCT VFR BLD AUTO: 44.2 % (ref 39–53)
HGB BLD-MCNC: 14.9 GM/DL (ref 13–17.5)
MCH RBC QN AUTO: 30.3 PG (ref 25–35)
MCHC RBC AUTO-ENTMCNC: 33.7 G/DL (ref 31–37)
MCV RBC AUTO: 89.9 FL (ref 80–100)
PLATELET # BLD AUTO: 178 K/UL (ref 150–450)
POTASSIUM SERPL-SCNC: 3.5 MMOL/L (ref 3.5–5.1)
PROT SERPL-MCNC: 6.1 G/DL (ref 6.3–8.2)
SODIUM SERPL-SCNC: 146 MMOL/L (ref 137–145)
WBC # BLD AUTO: 7.9 K/UL (ref 3.8–10.6)

## 2018-06-02 RX ADMIN — ALLOPURINOL SCH MG: 300 TABLET ORAL at 08:09

## 2018-06-02 RX ADMIN — CEFAZOLIN SCH MLS/HR: 330 INJECTION, POWDER, FOR SOLUTION INTRAMUSCULAR; INTRAVENOUS at 06:06

## 2018-06-02 RX ADMIN — ATENOLOL SCH MG: 25 TABLET ORAL at 08:09

## 2018-06-02 RX ADMIN — TAMSULOSIN HYDROCHLORIDE SCH MG: 0.4 CAPSULE ORAL at 08:10

## 2018-06-02 NOTE — P.DS
Providers


Date of admission: 


05/31/18 22:36





Attending physician: 


Milind Barr





Consults: 





 





05/31/18 22:39


Consult Physician Routine 


   Consulting Provider: Kathy Perla


   Consult Reason/Comments: Renal failure


   Do you want consulting provider notified?: Yes











Primary care physician: 


[facility] Course: 





This is 78-year-old  male patient who presented to the ER with 

bilateral ankle pain and inability to ambulate.  He also had some mild 

shortness of breath.  He was found to have acute kidney failure and was 

followed by nephrology during his hospitalization.  He received colchicine and 

I will Purinol and his gouty flareup subsided.  This may be associated with 

psoriatic arthritis as the patient does have a long-standing history of 

psoriasis.  His creatinine is down to 2.  He will follow-up with his PCP as an 

outpatient.  He's been able to ambulate without any difficulties.  Denies any 

pain.  Blood pressure has stabilized.  He denies shortness of breath.


Patient Condition at Discharge: Stable





Plan - Discharge Summary


Discharge Rx Participant: No


New Discharge Prescriptions: 


New


   Acetaminophen Tab [Tylenol] 650 mg PO Q6HR PRN  tab


     PRN Reason: Mild Pain Or Fever > 100.5


   amLODIPine [Norvasc] 5 mg PO DAILY #30 tab





Continue


   Tamsulosin HCl [Flomax] 0.4 mg PO DAILY


   Ranitidine HCl 150 mg PO BID


   Ergocalciferol [Vitamin D2 (DRISDOL)] 50,000 unit PO SA


   Allopurinol [Zyloprim] 300 mg PO DAILY


   Atenolol 25 mg PO BID


   traMADol HCL [Ultram] 50 mg PO Q6HR PRN


     PRN Reason: Pain





Discontinued


   Hydrochlorothiazide [Hydrodiuril] 50 mg PO DAILY


   amLODIPine BESYLATE [Norvasc] 10 mg PO DAILY


   Lisinopril [Zestril] 5 mg PO DAILY


   Colchicine 0.6 mg PO TID PRN


     PRN Reason: gout


Discharge Medication List





Allopurinol [Zyloprim] 300 mg PO DAILY 05/31/18 [History]


Atenolol 25 mg PO BID 05/31/18 [History]


Ergocalciferol [Vitamin D2 (DRISDOL)] 50,000 unit PO SA 05/31/18 [History]


Ranitidine HCl 150 mg PO BID 05/31/18 [History]


Tamsulosin HCl [Flomax] 0.4 mg PO DAILY 05/31/18 [History]


traMADol HCL [Ultram] 50 mg PO Q6HR PRN 05/31/18 [History]


Acetaminophen Tab [Tylenol] 650 mg PO Q6HR PRN  tab 06/02/18 [Rx]


amLODIPine [Norvasc] 5 mg PO DAILY #30 tab 06/02/18 [Rx]








Follow up Appointment(s)/Referral(s): 


[facility] [Primary Care Provider] - 3 Days


Discharge Disposition: HOME SELF-CARE

## 2018-06-02 NOTE — PN
PROGRESS NOTE



Patient is seen for followup for acute kidney injury. He will actually be discharged

today.  His serum creatinine has improved to 2.0 from 3.0 on initial admission.



PHYSICAL EXAMINATION:

Blood pressure is 128/62, heart rate 81 per minute. Patient is afebrile.

EXAMINATION OF THE HEART: S1, S2.

EXAMINATION OF LUNGS: Bilateral breath sounds are heard.

ABDOMEN:  Soft, non-tender.

Examination of lower extremities shows no significant edema.

CNS exam is grossly intact.



LABS:

Sodium 146, potassium 3.5, BUN 40, serum creatinine 2.01, hemoglobin 14.9.



ASSESSMENT:

1. Acute kidney injury, prerenal, currently improved.

2. Gout, maintained on colchicine and improved.

3. Hypertension.

4. Benign prostatic hypertrophy, maintained on Flomax.



PLAN:

The patient can be discharged from nephrology standpoint.  He is advised to avoid use

of any NSAIDs down the road.  Blood pressure is currently not too high.  However, if

patient needs ACE inhibitors, they can likely be restarted at a lower dose in the next

couple of days.  Patient should follow up with his PCP and he will likely need

nephrology followup as well if his renal function remains compromised.





MMODL / IJN: [id number] / Job#: [id number]

## 2018-08-29 ENCOUNTER — HOSPITAL ENCOUNTER (OUTPATIENT)
Dept: HOSPITAL 47 - RADCTMAIN | Age: 78
Discharge: HOME | End: 2018-08-29
Attending: ORTHOPAEDIC SURGERY
Payer: MEDICARE

## 2018-08-29 DIAGNOSIS — M16.0: Primary | ICD-10-CM

## 2018-08-29 NOTE — CT
EXAMINATION TYPE: CT hip LT wo con

 

DATE OF EXAM: 8/29/2018

 

COMPARISON: None

 

HISTORY: Left hip pain.

 

CT DLP: 463 mGycm

Automated exposure control for dose reduction was used.

 

FINDINGS: 

The pelvic ring appears intact. Sacroiliac joints appear intact. There is hypertrophic acetabular spu
rring. There is bilateral hypertrophic spurring of the femoral heads. I see no sign of a fracture. Th
ere is no hip dysplasia. There is no sign of a pelvic mass. There is no free fluid in the pelvis. Pro
state is enlarged with calcification.

 

IMPRESSION: 

HYPERTROPHIC OSTEOARTHRITIS IN BOTH HIP JOINTS. NO FRACTURE.

## 2018-09-11 ENCOUNTER — HOSPITAL ENCOUNTER (OUTPATIENT)
Dept: HOSPITAL 47 - LABPAT | Age: 78
Discharge: HOME | End: 2018-09-11
Attending: ORTHOPAEDIC SURGERY
Payer: MEDICARE

## 2018-09-11 DIAGNOSIS — Z79.01: ICD-10-CM

## 2018-09-11 DIAGNOSIS — Z01.812: Primary | ICD-10-CM

## 2018-09-11 LAB
ALBUMIN SERPL-MCNC: 3.9 G/DL (ref 3.5–5)
ALP SERPL-CCNC: 59 U/L (ref 38–126)
ALT SERPL-CCNC: 33 U/L (ref 21–72)
ANION GAP SERPL CALC-SCNC: 8 MMOL/L
APTT BLD: 27.4 SEC (ref 22–30)
AST SERPL-CCNC: 13 U/L (ref 17–59)
BUN SERPL-SCNC: 25 MG/DL (ref 9–20)
CALCIUM SPEC-MCNC: 9.2 MG/DL (ref 8.4–10.2)
CHLORIDE SERPL-SCNC: 103 MMOL/L (ref 98–107)
CO2 SERPL-SCNC: 30 MMOL/L (ref 22–30)
ERYTHROCYTE [DISTWIDTH] IN BLOOD BY AUTOMATED COUNT: 4.84 M/UL (ref 4.3–5.9)
ERYTHROCYTE [DISTWIDTH] IN BLOOD: 15.1 % (ref 11.5–15.5)
GLUCOSE SERPL-MCNC: 101 MG/DL (ref 74–99)
HCT VFR BLD AUTO: 46 % (ref 39–53)
HGB BLD-MCNC: 14.4 GM/DL (ref 13–17.5)
INR PPP: 1.2 (ref ?–1.2)
MCH RBC QN AUTO: 29.7 PG (ref 25–35)
MCHC RBC AUTO-ENTMCNC: 31.2 G/DL (ref 31–37)
MCV RBC AUTO: 95.1 FL (ref 80–100)
PH UR: 6.5 [PH] (ref 5–8)
PLATELET # BLD AUTO: 184 K/UL (ref 150–450)
POTASSIUM SERPL-SCNC: 4.3 MMOL/L (ref 3.5–5.1)
PROT SERPL-MCNC: 6.7 G/DL (ref 6.3–8.2)
PT BLD: 11.4 SEC (ref 9–12)
SODIUM SERPL-SCNC: 141 MMOL/L (ref 137–145)
SP GR UR: 1.01 (ref 1–1.03)
UROBILINOGEN UR QL STRIP: <2 MG/DL (ref ?–2)
WBC # BLD AUTO: 11.6 K/UL (ref 3.8–10.6)

## 2018-09-11 PROCEDURE — 36415 COLL VENOUS BLD VENIPUNCTURE: CPT

## 2018-09-11 PROCEDURE — 85610 PROTHROMBIN TIME: CPT

## 2018-09-11 PROCEDURE — 80053 COMPREHEN METABOLIC PANEL: CPT

## 2018-09-11 PROCEDURE — 85027 COMPLETE CBC AUTOMATED: CPT

## 2018-09-11 PROCEDURE — 81003 URINALYSIS AUTO W/O SCOPE: CPT

## 2018-09-11 PROCEDURE — 87070 CULTURE OTHR SPECIMN AEROBIC: CPT

## 2018-09-11 PROCEDURE — 85730 THROMBOPLASTIN TIME PARTIAL: CPT

## 2018-09-17 ENCOUNTER — HOSPITAL ENCOUNTER (OUTPATIENT)
Dept: HOSPITAL 47 - LABWHC1 | Age: 78
Discharge: HOME | End: 2018-09-17
Attending: ORTHOPAEDIC SURGERY
Payer: MEDICARE

## 2018-09-17 DIAGNOSIS — Z01.812: Primary | ICD-10-CM

## 2018-09-17 PROCEDURE — 86900 BLOOD TYPING SEROLOGIC ABO: CPT

## 2018-09-17 PROCEDURE — 86901 BLOOD TYPING SEROLOGIC RH(D): CPT

## 2018-09-17 PROCEDURE — 86850 RBC ANTIBODY SCREEN: CPT

## 2018-09-25 ENCOUNTER — HOSPITAL ENCOUNTER (INPATIENT)
Dept: HOSPITAL 47 - 2ORMAIN | Age: 78
LOS: 1 days | Discharge: HOME HEALTH | DRG: 470 | End: 2018-09-26
Attending: ORTHOPAEDIC SURGERY | Admitting: ORTHOPAEDIC SURGERY
Payer: MEDICARE

## 2018-09-25 VITALS — BODY MASS INDEX: 27.8 KG/M2

## 2018-09-25 DIAGNOSIS — G47.33: ICD-10-CM

## 2018-09-25 DIAGNOSIS — I27.20: ICD-10-CM

## 2018-09-25 DIAGNOSIS — Z79.01: ICD-10-CM

## 2018-09-25 DIAGNOSIS — Z83.79: ICD-10-CM

## 2018-09-25 DIAGNOSIS — Z87.11: ICD-10-CM

## 2018-09-25 DIAGNOSIS — F17.210: ICD-10-CM

## 2018-09-25 DIAGNOSIS — J44.9: ICD-10-CM

## 2018-09-25 DIAGNOSIS — I10: ICD-10-CM

## 2018-09-25 DIAGNOSIS — Z87.448: ICD-10-CM

## 2018-09-25 DIAGNOSIS — I48.0: ICD-10-CM

## 2018-09-25 DIAGNOSIS — Z85.828: ICD-10-CM

## 2018-09-25 DIAGNOSIS — L40.9: ICD-10-CM

## 2018-09-25 DIAGNOSIS — Z80.0: ICD-10-CM

## 2018-09-25 DIAGNOSIS — Z79.899: ICD-10-CM

## 2018-09-25 DIAGNOSIS — Z71.6: ICD-10-CM

## 2018-09-25 DIAGNOSIS — M10.9: ICD-10-CM

## 2018-09-25 DIAGNOSIS — I34.0: ICD-10-CM

## 2018-09-25 DIAGNOSIS — M16.12: Primary | ICD-10-CM

## 2018-09-25 DIAGNOSIS — R97.20: ICD-10-CM

## 2018-09-25 DIAGNOSIS — K21.9: ICD-10-CM

## 2018-09-25 DIAGNOSIS — N40.0: ICD-10-CM

## 2018-09-25 DIAGNOSIS — M54.9: ICD-10-CM

## 2018-09-25 DIAGNOSIS — E78.5: ICD-10-CM

## 2018-09-25 DIAGNOSIS — Z90.49: ICD-10-CM

## 2018-09-25 LAB — GLUCOSE BLD-MCNC: 107 MG/DL (ref 75–99)

## 2018-09-25 PROCEDURE — 86850 RBC ANTIBODY SCREEN: CPT

## 2018-09-25 PROCEDURE — 86900 BLOOD TYPING SEROLOGIC ABO: CPT

## 2018-09-25 PROCEDURE — 85025 COMPLETE CBC W/AUTO DIFF WBC: CPT

## 2018-09-25 PROCEDURE — 0SRB06A REPLACEMENT OF LEFT HIP JOINT WITH OXIDIZED ZIRCONIUM ON POLYETHYLENE SYNTHETIC SUBSTITUTE, UNCEMENTED, OPEN APPROACH: ICD-10-PCS

## 2018-09-25 PROCEDURE — 30233N0 TRANSFUSION OF AUTOLOGOUS RED BLOOD CELLS INTO PERIPHERAL VEIN, PERCUTANEOUS APPROACH: ICD-10-PCS

## 2018-09-25 PROCEDURE — 73501 X-RAY EXAM HIP UNI 1 VIEW: CPT

## 2018-09-25 PROCEDURE — 86891 AUTOLOGOUS BLOOD OP SALVAGE: CPT

## 2018-09-25 PROCEDURE — 88300 SURGICAL PATH GROSS: CPT

## 2018-09-25 PROCEDURE — 86901 BLOOD TYPING SEROLOGIC RH(D): CPT

## 2018-09-25 RX ADMIN — CEFAZOLIN SCH GM: 10 INJECTION, POWDER, FOR SOLUTION INTRAVENOUS at 19:58

## 2018-09-25 RX ADMIN — CEFAZOLIN SCH: 330 INJECTION, POWDER, FOR SOLUTION INTRAMUSCULAR; INTRAVENOUS at 18:54

## 2018-09-25 RX ADMIN — ATENOLOL SCH MG: 25 TABLET ORAL at 19:58

## 2018-09-25 RX ADMIN — POTASSIUM CHLORIDE SCH MLS: 14.9 INJECTION, SOLUTION INTRAVENOUS at 11:35

## 2018-09-25 NOTE — XR
Limited left hip

 

HISTORY: Anterior hip replacement

 

2 intraoperative C-arm images document the procedure.

## 2018-09-25 NOTE — XR
Left hip Limited

 

HISTORY: Status post left hip arthroplasty

 

Single frontal view of the left hip

 

Ration is status post left hip arthroplasty. There is anatomic alignment. Lucency present in the soft
 tissues compatible with postop state.

 

IMPRESSION: Orthopedic follow-up.

## 2018-09-25 NOTE — FL
Fluoroscopy

 

HISTORY: Hip replacement

 

53 seconds fluoroscopy time supplied to the referring clinician.  2 intraoperative C-arm images docum
ent the procedure. See dictated report from orthopedic surgery.

## 2018-09-25 NOTE — P.OP
Date of Procedure: 09/25/18


Preoperative Diagnosis: 


Severe osteoarthritis left hip


Postoperative Diagnosis: 


Severe osteoarthritis left hip


Procedure(s) Performed: 


Left total hip arthroplasty with a direct anterior approach


Implants: 


Smith and nephew Polarstem size 5 standard


Smith & Nephew R3, 3 hole acetabular shell, 54 mm


Smith & Nephew reflection 6.5 mm cancellus screw, 20 mm 2


Smith & Nephew R3, XLPE 20 acetabular liner


Smith & Nephew Oxinium femoral head 36 m, +8


All components were press-fit.


The articulation is Oxinium on polyethylene.


Anesthesia: spinal


Surgeon: Tommy Grayson


Assistant #1: Reena Tirado


Estimated Blood Loss (ml): 350 (119 mL returned in Cell Saver)


Pathology: other


Condition: stable


Disposition: PACU


Indications for Procedure: 


After failure of conservative treatment we discussed the surgical and 

nonsurgical treatment options at length.  Patient wishes to proceed with a 

total hip arthroplasty with a direct anterior approach.  Complications specific 

to this procedure were discussed at length, including but not limited to 

infection, leg length discrepancy, dislocation, and nerve injury.  Patient is 

aware of all these complications and informed consent was obtained


Operative Findings: 


The operative findings are consistent with severe osteoarthritis the left hip


Description of Procedure: 


Patient was seen and evaluated in the preoperative area, consent was reviewed, 

and the surgical site was marked with a skin marker.  Patient was then brought 

to the operating room and given prophylactic antibiotics intravenously.  1 g of 

Tranexamic acid was also given.  A spinal anesthetic was administered by the 

anesthesia department.   The patient was then placed on the [place] table with the 

bony prominences well-padded.  The hip area was then prepped and draped in 

usual sterile fashion.





A universal timeout was then performed, which confirmed the patient's name, 

surgical site, ALLERGIES, and procedure being performed.  Next the incision 

site was located at 1 cm distal and 1 cm lateral to the anterior superior iliac 

spine.  The skin and subcutaneous tissues were sharply incised.  Incision was 

carefully dissected down to the fascia overlying the tensor fascia josh muscle.

  This fascia was then incised in line with the incision.  Next, using blunt 

finger dissection, the tensor fascia josh muscle was dissected off its 

investing fascia.  The muscle was then carefully retracted laterally with a 

cobra retractor over the lateral neck of the femur.  Next, the circumflex 

vessels were identified and cauterized using the AquaMantis device.  The 

anterior hip capsule was then exposed.  The capsule was then opened and an 

inverted T fashion.  Cobra retractors were then placed intracapsularly.  The 

proximal femur was then visualized.





The femoral neck was then osteotomized appropriate level above the lesser 

trochanter.  Small amount of traction was placed with the [place] table.  A small 

wedge of bone was then removed from the remaining femoral head.  Next, using a 

corkscrew femoral head was easily removed from the acetabulum.  On gross visual 

inspection, the femoral head had complete loss of articular cartilage in 

multiple periarticular osteophytes.  Attention was then turned to the 

acetabulum.





the acetabulum was exposed and any remaining labrum was excised.  Sequential 

reaming of the acetabulum was performed using fluoroscopic guidance.  When the 

appropriate size was reached, a trial was then placed.  The position and fit of 

the trial was checked with fluoroscopy.  The trial was then removed.  Then, 

using fluoroscopic guidance, the final implant was impacted at 20 of 

anteversion and 40 of abduction, and fully seated in the acetabulum.  2 screws 

were then placed in the acetabulum.  Again fluoroscopy was used to check 

position of the screws.  Next, the liner was then impacted, with a 20 elevated 

liner located in the anterior superior quadrant.  Component locking was 

confirmed.





Attention was then directed to the femur.  With the aid of the [place] table, the 

femur was externally rotated to approximately 130, extended, and abducted 

under the opposite leg.  A side hook was then placed under the proximal femur, 

and the side hook elevator was used to elevate the proximal femur.  Retractors 

were then placed.  A capsular release was performed, as well as a release of 

the conjoined tendon, which afforded excellent visualization of the proximal 

femur.  Next, a box osteotome was used to lateralize the proximal femur.  A 

 was then used to locate the femoral canal.  Sequential broaching 

was then performed with appropriate size which afforded excellent fixation in 

the proximal femur.  A trial was then placed with appropriate head and neck, 

and the hip was gently reduced with the aid of the [place] table.  Fluoroscopy was 

then used to check position of the components, as well as to ensure equal leg 

lengths.  The hip was then gently dislocated and the trials were then removed.  

Final implants were then impacted and the hip was again reduced.  Final 

fluoroscopic x-rays confirmed that the components were in anatomic position, as 

well as equal leg lengths.  The hip was also taken through range of motion, and 

found to be stable.





The hip was then copiously irrigated with antibiotic solution with pulsatile 

lavage.  The hip was then irrigated with Irrisept solution.  The soft tissues 

were then injected with a ropivacaine solution, which consisted of 246.25 mg of 

ropivacaine, 0.5 mg of epinephrine, 30 mg of Toradol, 80 g of clonidine, and 

48.45 mL of sterile water, for a total of 100 mL of fluid injected.  A second 

dose of 1 g of Tranexamic acid was also given.





the fascia was then closed with 2-0 strata fix suture.  The subcutaneous tissue 

was closed with 3-0 Vicryl.  The subcuticular tissue was closed with 3-0 strata 

fix suture.  The skin was then closed with Dermabond glue and a sterile silver 

dressing.  The patient was then transferred to the recovery room in stable 

condition.





The assistant  THEODORA Garcia was required due to the complexity of surgery, 

and the need for skilled surgical assistant for positioning, draping, exposure, 

retraction, and closure of the wound.

## 2018-09-26 VITALS — HEART RATE: 91 BPM | DIASTOLIC BLOOD PRESSURE: 67 MMHG | TEMPERATURE: 98.1 F | SYSTOLIC BLOOD PRESSURE: 120 MMHG

## 2018-09-26 VITALS — RESPIRATION RATE: 16 BRPM

## 2018-09-26 LAB
BASOPHILS # BLD AUTO: 0 K/UL (ref 0–0.2)
BASOPHILS NFR BLD AUTO: 0 %
EOSINOPHIL # BLD AUTO: 0 K/UL (ref 0–0.7)
EOSINOPHIL NFR BLD AUTO: 0 %
ERYTHROCYTE [DISTWIDTH] IN BLOOD BY AUTOMATED COUNT: 4.13 M/UL (ref 4.3–5.9)
ERYTHROCYTE [DISTWIDTH] IN BLOOD: 15 % (ref 11.5–15.5)
HCT VFR BLD AUTO: 40.8 % (ref 39–53)
HGB BLD-MCNC: 13 GM/DL (ref 13–17.5)
LYMPHOCYTES # SPEC AUTO: 0.8 K/UL (ref 1–4.8)
LYMPHOCYTES NFR SPEC AUTO: 7 %
MCH RBC QN AUTO: 31.4 PG (ref 25–35)
MCHC RBC AUTO-ENTMCNC: 31.7 G/DL (ref 31–37)
MCV RBC AUTO: 98.8 FL (ref 80–100)
MONOCYTES # BLD AUTO: 0.7 K/UL (ref 0–1)
MONOCYTES NFR BLD AUTO: 6 %
NEUTROPHILS # BLD AUTO: 9.3 K/UL (ref 1.3–7.7)
NEUTROPHILS NFR BLD AUTO: 85 %
PLATELET # BLD AUTO: 100 K/UL (ref 150–450)
WBC # BLD AUTO: 10.9 K/UL (ref 3.8–10.6)

## 2018-09-26 RX ADMIN — CEFAZOLIN SCH GM: 10 INJECTION, POWDER, FOR SOLUTION INTRAVENOUS at 05:32

## 2018-09-26 RX ADMIN — ATENOLOL SCH MG: 25 TABLET ORAL at 09:20

## 2018-09-26 RX ADMIN — CEFAZOLIN SCH: 330 INJECTION, POWDER, FOR SOLUTION INTRAMUSCULAR; INTRAVENOUS at 05:33

## 2018-09-26 RX ADMIN — POTASSIUM CHLORIDE SCH: 14.9 INJECTION, SOLUTION INTRAVENOUS at 05:34

## 2018-09-26 NOTE — P.DS
Providers


Date of admission: 


09/25/18 10:11





Expected date of discharge: 09/26/18


Attending physician: 


Tommy Grayson





Consults: 





 





09/25/18 12:46


Consult Physician Routine 


   Consulting Provider: Kim Hays


   Consult Reason/Comments: medical management and anticoagulation


   Do you want consulting provider notified?: Yes











Primary care physician: 


[facility]








- Discharge Diagnosis(es)


(1) Primary osteoarthritis of left hip


Current Visit: Yes   Status: Acute   





(2) S/P total hip arthroplasty


Current Visit: Yes   Status: Acute   


Hospital Course: 


This is a 78-year-old male with known history of degenerative arthritis of the 

left hip.  The patient presents for evaluation.  After discussion and 

consideration patient elects to proceed with total hip arthroplasty.  The 

patient is seen preoperatively by Dr. Grayson and medically cleared for 

surgery by their primary care physician.





Patient is admitted to [facility] on 09/25/2018 for total hip 

arthroplasty.  The procedures performed without complication or sequelae.  The 

patient is doing well postoperatively.  Labs and vital signs are stable on day 

of discharge.





On day of discharge patient's hip incision is healing well.  There is minimal 

erythema.  There is no drainage noted at this time.  There is minimal soft 

tissue swelling to the hip and thigh.  Patient has full foot and ankle motion 

without difficulty or pain.  Neurovascular status to the left lower extremity 

is intact.  Patient is discharged home in good condition. Please see med rec 

for accurate list of home medications.








Plan - Discharge Summary


Discharge Rx Participant: No


New Discharge Prescriptions: 


New


   HYDROcodone/APAP 5-325MG [Nor[place] 5-325] 1 - 2 tab PO Q4-6H PRN #84 tab


     PRN Reason: Pain


   Sennosides [Senokot] 1 tab PO BID #60 tablet





No Action


   Tamsulosin HCl [Flomax] 0.4 mg PO DAILY


   Ranitidine HCl 150 mg PO BID


   Allopurinol [Zyloprim] 300 mg PO DAILY


   Atenolol 25 mg PO BID


   amLODIPine [Norvasc] 5 mg PO DAILY #30 tab


   Rivaroxaban [Xarelto] 15 mg PO DAILY


   HYDROcodone/APAP 5-325MG [Nor[place] 5-325] 1 tab PO BID PRN


     PRN Reason: Pain


   Cholecalciferol (Vitamin D3) [Vitamin D3] 2,000 unit PO DAILY


Discharge Medication List





Allopurinol [Zyloprim] 300 mg PO DAILY 05/31/18 [History]


Atenolol 25 mg PO BID 05/31/18 [History]


Ranitidine HCl 150 mg PO BID 05/31/18 [History]


Tamsulosin HCl [Flomax] 0.4 mg PO DAILY 05/31/18 [History]


amLODIPine [Norvasc] 5 mg PO DAILY #30 tab 06/02/18 [Rx]


Cholecalciferol (Vitamin D3) [Vitamin D3] 2,000 unit PO DAILY 09/13/18 [History]


HYDROcodone/APAP 5-325MG [[place] 5-325] 1 tab PO BID PRN 09/13/18 [History]


Rivaroxaban [Xarelto] 15 mg PO DAILY 09/13/18 [History]


HYDROcodone/APAP 5-325MG [Norco 5-325] 1 - 2 tab PO Q4-6H PRN #84 tab 09/26/18 [

Rx]


Sennosides [Senokot] 1 tab PO BID #60 tablet 09/26/18 [Rx]








Follow up Appointment(s)/Referral(s): 


Tommy Grayson DO [Doctor of Osteopathic Medicine] - 2 Weeks


Activity/Diet/Wound Care/Special Instructions: 


Weightbearing as tolerated with walker


Leave dressing intact.  Dressing may be removed by home care nurse in 10 days.


May shower with dressing on.


Follow-up with [facility] in 2 weeks, please call with any questions 

or concerns [phone]


Discharge Disposition: HOME WITH HOME HEALTH SERVICES

## 2018-09-26 NOTE — P.CONS
History of Present Illness





- History of Present Illness





this is a pleasant 77 yo M with pmh of a fib, copd, GERD, prostate disease, 

psoriasis , gout, and osteoarthritis and degenerative joint disease.  Presents 

for total left hip arthroplasty which was done on 09/25/2018 and underwent been 

asked to consult for medical management.  Patient was sitting at bedside, 

complaining from minimal pain at the surgical site.  No chest pain.  No 

abdominal pain.  No dyspnea.  No change in urine bowel habits.  Vomiting.  No 

fever.  Patient and his wife at bedside telling me he is taken Toprol and 0 for 

his A. fib and that they're going to follow up with Dr. Coronado his cardiologist 

and they have the follow-up date in this coming October at home or ligament 

seen.  Patient was noticed to have mild leukocytosis trending down as per wife 

patient was taken steroids prior to admission for his gout which is now 

stopped.  Instructed patient and family to follow-up his WBC one sees his 

doctor and they understood verbalized understanding and acceptance and said it 

back to me . 





Review of Systems





CONSTITUTIONAL: No fever, no malaise, no fatigue. 


HEENT: No recent visual problems or hearing problems. Denied any sore throat. 


CARDIOVASCULAR: No  orthopnea, PND, no palpitations, no syncope. 


PULMONARY: No shortness of breath, no cough, no hemoptysis. 


GASTROINTESTINAL: No diarrhea, no nausea, no vomiting, no abdominal pain. 

Normoactive bowel sounds. 


NEUROLOGICAL: No headaches, no weakness, no numbness. 


HEMATOLOGICAL: Denies any bleeding or petechiae. 


GENITOURINARY: Denies any burning micturition, frequency, or urgency. 


MUSCULOSKELETAL/RHEUMATOLOGICAL: Denies any joint pain, swelling, or any muscle 

pain. 


ENDOCRINE: Denies any polyuria or polydipsia.














Past Medical History


Past Medical History: Atrial Fibrillation, Cancer, COPD, GERD/Reflux, 

Hyperlipidemia, Hypertension, Osteoarthritis (OA), Prostate Disorder, Renal 

Disease, Skin Disorder


Additional Past Medical History / Comment(s): PSORIASIS, GOUT, ELEVATED PSA, 

SKIN CANCER ., HX OF BLEEDING STOMACH ULCER., HX OF ACUTE RENAL FAILURE, BACK 

PAIN , LEFT LEG PAIN, GOUT IN ANKLES & TOES., STATES RECENT STEROID INJECTIONS 

AND PREDNISONE DOSE PACK.


History of Any Multi-Drug Resistant Organisms: None Reported


Past Surgical History: Appendectomy, Back Surgery


Additional Past Surgical History / Comment(s): 4 back surgeries, PROCEDURE FOR 

BLEEDING STOMACH ULCER.


Past Anesthesia/Blood Transfusion Reactions: No Reported Reaction


Past Psychological History: No Psychological Hx Reported


Smoking Status: Heavy tobacco smoker


Past Alcohol Use History: None Reported


Additional Past Alcohol Use History / Comment(s): SMOKES 1 PPD.  SMOKING FOR 50 

YEARS.


Past Drug Use History: None Reported





- Past Family History


  ** Mother


Family Medical History: Cancer


Additional Family Medical History / Comment(s): cirriosis





  ** Father


Family Medical History: No Reported History





  ** Brother(s)


Family Medical History: Cancer


Additional Family Medical History / Comment(s): COLON CANCER





Medications and Allergies


 Home Medications











 Medication  Instructions  Recorded  Confirmed  Type


 


Allopurinol [Zyloprim] 300 mg PO DAILY 05/31/18 09/25/18 History


 


Atenolol 25 mg PO BID 05/31/18 09/25/18 History


 


Ranitidine HCl 150 mg PO BID 05/31/18 09/25/18 History


 


Tamsulosin HCl [Flomax] 0.4 mg PO DAILY 05/31/18 09/25/18 History


 


amLODIPine [Norvasc] 5 mg PO DAILY #30 tab 06/02/18 09/25/18 Rx


 


Cholecalciferol (Vitamin D3) 2,000 unit PO DAILY 09/13/18 09/25/18 History





[Vitamin D3]    


 


HYDROcodone/APAP 5-325MG [Norco 1 tab PO BID PRN 09/13/18 09/25/18 History





5-325]    


 


Rivaroxaban [Xarelto] 15 mg PO DAILY 09/13/18 09/25/18 History


 


HYDROcodone/APAP 5-325MG [Norco 1 - 2 tab PO Q4-6H PRN #45 tab 09/26/18  Rx





5-325]    


 


Sennosides [Senokot] 1 tab PO BID #60 tablet 09/26/18  Rx











 Allergies











Allergy/AdvReac Type Severity Reaction Status Date / Time


 


No Known Allergies Allergy   Verified 09/25/18 18:14














Physical Exam


Vitals: 


 Vital Signs











  Temp Pulse Pulse Resp BP BP Pulse Ox


 


 09/26/18 07:00  98.1 F   91  16   120/67  95


 


 09/25/18 23:47  97.7 F   89  16   108/70  95


 


 09/25/18 20:00  97.2 F L   80  18   107/72  96


 


 09/25/18 17:32  96.8 F L  62   17   113/72  97


 


 09/25/18 16:45   63   16   130/71  100


 


 09/25/18 16:15   78   16   127/63  97


 


 09/25/18 15:45   64   16   116/76  98


 


 09/25/18 15:15   72   16   115/63  96


 


 09/25/18 15:00   71   17   124/60  95


 


 09/25/18 14:45   78   16   132/60  97


 


 09/25/18 14:30   64   16   129/73  97


 


 09/25/18 14:15   69   16   120/73  100


 


 09/25/18 13:59  97.0 F L  68   16   128/65  95


 


 09/25/18 11:47  97.5 F L   87  18  120/66   94 L








 Intake and Output











 09/25/18 09/26/18 09/26/18





 22:59 06:59 14:59


 


Intake Total 247.5  


 


Output Total  200 


 


Balance 247.5 -200 


 


Intake:   


 


  IV 20  


 


  Intake, IV Titration 227.5  





  Amount   


 


    Sodium Chloride 0.9% 1, 227.5  





    000 ml @ 65 mls/hr IV .   





    [id number] [facility] Rx#:[id number]   


 


Output:   


 


  Urine  200 


 


Other:   


 


  Voiding Method Toilet  


 


  # Voids 2  


 


  Weight 90.718 kg  














GENERAL: The patient is alert and oriented x3, not in any acute distress. Well 

developed, well nourished. 


HEENT: Pupils are round and equally reacting to light. EOMI. No scleral 

icterus. No conjunctival pallor. Normocephalic, atraumatic. No pharyngeal 

erythema. No thyromegaly. 


CARDIOVASCULAR: S1 and S2 present. No murmurs, rubs, or gallops. 


PULMONARY: Chest is clear to auscultation, no wheezing or crackles. 


ABDOMEN: Soft, nontender, nondistended, normoactive bowel sounds. No palpable 

organomegaly. 


MUSCULOSKELETAL: No joint swelling or deformity. 


EXTREMITIES: No cyanosis, clubbing, or pedal edema. 


NEUROLOGICAL: Gross neurological examination did not reveal any focal deficits. 


SKIN: No rashes.





Results


CBC & Chem 7: 


 09/26/18 06:58





Labs: 


 Abnormal Lab Results - Last 24 Hours (Table)











  09/25/18 09/26/18 Range/Units





  11:29 06:58 


 


WBC   10.9 H  (3.8-10.6)  k/uL


 


RBC   4.13 L  (4.30-5.90)  m/uL


 


Plt Count   100 L  (150-450)  k/uL


 


Neutrophils #   9.3 H  (1.3-7.7)  k/uL


 


Lymphocytes #   0.8 L  (1.0-4.8)  k/uL


 


POC Glucose (mg/dL)  107 H   (75-99)  mg/dL














Assessment and Plan


Assessment: 





History of degenerative joint disease, status post left total hip arthroplasty


History of A. fib, rate controlled


Leukocytosis, mostly reactive.  Her, and close follow-up


History of osteoarthritis


Psoriasis


history of gout


BPH


Plan: 





This is a pleasant 78 years old male who presents for left hip arthroplasty.  

Continue with same treatment.  Continue symptomatic treatment.  Resume home 

medication.  Monitor vitals.  His leukocytosis is mostly reactive due to 

medication and surgery, Trending down.  Recommend close follow-up as his WBC 

tablets normalized.  Denies symptoms of overt infection.  No respiratory or 

urinary symptoms.  No rash or change in bowel habits.  Antibiotics at this 

point has more risks and benefits.  We are comment DVT and GI prophylaxis.  

Pain management and DVT prophylaxis as per primary team.  Further 

recommendations based on the clinical course of the patient's.


Prognosis guarded


Patient was instructed to follow up with his PCP within one week.  [facility]  And follow-up with his cardiologist as recommended.  Please 

follow up with his orthopedic team as per primary team





Thank you for consulting us, please feel free to contact us for any further 

question or clarification.

## 2018-10-29 ENCOUNTER — HOSPITAL ENCOUNTER (OUTPATIENT)
Dept: HOSPITAL 47 - EC | Age: 78
Setting detail: OBSERVATION
LOS: 4 days | Discharge: HOME | End: 2018-11-02
Attending: INTERNAL MEDICINE | Admitting: INTERNAL MEDICINE
Payer: MEDICARE

## 2018-10-29 DIAGNOSIS — Z79.01: ICD-10-CM

## 2018-10-29 DIAGNOSIS — Z83.79: ICD-10-CM

## 2018-10-29 DIAGNOSIS — Z96.642: ICD-10-CM

## 2018-10-29 DIAGNOSIS — M10.9: ICD-10-CM

## 2018-10-29 DIAGNOSIS — K57.51: Primary | ICD-10-CM

## 2018-10-29 DIAGNOSIS — N40.0: ICD-10-CM

## 2018-10-29 DIAGNOSIS — D12.0: ICD-10-CM

## 2018-10-29 DIAGNOSIS — Z90.89: ICD-10-CM

## 2018-10-29 DIAGNOSIS — D62: ICD-10-CM

## 2018-10-29 DIAGNOSIS — J44.9: ICD-10-CM

## 2018-10-29 DIAGNOSIS — E66.9: ICD-10-CM

## 2018-10-29 DIAGNOSIS — K29.51: ICD-10-CM

## 2018-10-29 DIAGNOSIS — D12.3: ICD-10-CM

## 2018-10-29 DIAGNOSIS — M54.9: ICD-10-CM

## 2018-10-29 DIAGNOSIS — Z87.11: ICD-10-CM

## 2018-10-29 DIAGNOSIS — K64.4: ICD-10-CM

## 2018-10-29 DIAGNOSIS — G89.29: ICD-10-CM

## 2018-10-29 DIAGNOSIS — Z79.899: ICD-10-CM

## 2018-10-29 DIAGNOSIS — F17.200: ICD-10-CM

## 2018-10-29 DIAGNOSIS — I48.91: ICD-10-CM

## 2018-10-29 DIAGNOSIS — Z80.9: ICD-10-CM

## 2018-10-29 DIAGNOSIS — Z85.828: ICD-10-CM

## 2018-10-29 DIAGNOSIS — Z80.0: ICD-10-CM

## 2018-10-29 DIAGNOSIS — D12.4: ICD-10-CM

## 2018-10-29 DIAGNOSIS — D12.1: ICD-10-CM

## 2018-10-29 DIAGNOSIS — K62.1: ICD-10-CM

## 2018-10-29 DIAGNOSIS — M19.90: ICD-10-CM

## 2018-10-29 DIAGNOSIS — L40.9: ICD-10-CM

## 2018-10-29 DIAGNOSIS — E78.5: ICD-10-CM

## 2018-10-29 DIAGNOSIS — D12.5: ICD-10-CM

## 2018-10-29 DIAGNOSIS — K21.9: ICD-10-CM

## 2018-10-29 DIAGNOSIS — I10: ICD-10-CM

## 2018-10-29 LAB
ALBUMIN SERPL-MCNC: 3.7 G/DL (ref 3.5–5)
ALP SERPL-CCNC: 80 U/L (ref 38–126)
ALT SERPL-CCNC: 15 U/L (ref 21–72)
ANION GAP SERPL CALC-SCNC: 9 MMOL/L
APTT BLD: 28.5 SEC (ref 22–30)
AST SERPL-CCNC: 15 U/L (ref 17–59)
BASOPHILS # BLD AUTO: 0 K/UL (ref 0–0.2)
BASOPHILS # BLD AUTO: 0 K/UL (ref 0–0.2)
BASOPHILS NFR BLD AUTO: 0 %
BASOPHILS NFR BLD AUTO: 0 %
BUN SERPL-SCNC: 21 MG/DL (ref 9–20)
CALCIUM SPEC-MCNC: 9.5 MG/DL (ref 8.4–10.2)
CHLORIDE SERPL-SCNC: 111 MMOL/L (ref 98–107)
CO2 SERPL-SCNC: 23 MMOL/L (ref 22–30)
EOSINOPHIL # BLD AUTO: 0.2 K/UL (ref 0–0.7)
EOSINOPHIL # BLD AUTO: 0.2 K/UL (ref 0–0.7)
EOSINOPHIL NFR BLD AUTO: 3 %
EOSINOPHIL NFR BLD AUTO: 4 %
ERYTHROCYTE [DISTWIDTH] IN BLOOD BY AUTOMATED COUNT: 4.24 M/UL (ref 4.3–5.9)
ERYTHROCYTE [DISTWIDTH] IN BLOOD BY AUTOMATED COUNT: 4.38 M/UL (ref 4.3–5.9)
ERYTHROCYTE [DISTWIDTH] IN BLOOD BY AUTOMATED COUNT: 4.4 M/UL (ref 4.3–5.9)
ERYTHROCYTE [DISTWIDTH] IN BLOOD: 14.8 % (ref 11.5–15.5)
ERYTHROCYTE [DISTWIDTH] IN BLOOD: 15 % (ref 11.5–15.5)
ERYTHROCYTE [DISTWIDTH] IN BLOOD: 15.1 % (ref 11.5–15.5)
GLUCOSE SERPL-MCNC: 116 MG/DL (ref 74–99)
HCT VFR BLD AUTO: 39.9 % (ref 39–53)
HCT VFR BLD AUTO: 40.4 % (ref 39–53)
HCT VFR BLD AUTO: 41.5 % (ref 39–53)
HGB BLD-MCNC: 12.7 GM/DL (ref 13–17.5)
HGB BLD-MCNC: 13 GM/DL (ref 13–17.5)
HGB BLD-MCNC: 13.2 GM/DL (ref 13–17.5)
INR PPP: 1.1 (ref ?–1.2)
LYMPHOCYTES # SPEC AUTO: 1.2 K/UL (ref 1–4.8)
LYMPHOCYTES # SPEC AUTO: 1.3 K/UL (ref 1–4.8)
LYMPHOCYTES NFR SPEC AUTO: 19 %
LYMPHOCYTES NFR SPEC AUTO: 19 %
MCH RBC QN AUTO: 29.7 PG (ref 25–35)
MCH RBC QN AUTO: 30 PG (ref 25–35)
MCH RBC QN AUTO: 30.1 PG (ref 25–35)
MCHC RBC AUTO-ENTMCNC: 31.4 G/DL (ref 31–37)
MCHC RBC AUTO-ENTMCNC: 31.8 G/DL (ref 31–37)
MCHC RBC AUTO-ENTMCNC: 32.6 G/DL (ref 31–37)
MCV RBC AUTO: 92.4 FL (ref 80–100)
MCV RBC AUTO: 94.3 FL (ref 80–100)
MCV RBC AUTO: 94.5 FL (ref 80–100)
MONOCYTES # BLD AUTO: 0.4 K/UL (ref 0–1)
MONOCYTES # BLD AUTO: 0.5 K/UL (ref 0–1)
MONOCYTES NFR BLD AUTO: 6 %
MONOCYTES NFR BLD AUTO: 7 %
NEUTROPHILS # BLD AUTO: 4.6 K/UL (ref 1.3–7.7)
NEUTROPHILS # BLD AUTO: 4.7 K/UL (ref 1.3–7.7)
NEUTROPHILS NFR BLD AUTO: 69 %
NEUTROPHILS NFR BLD AUTO: 69 %
PH UR: 7 [PH] (ref 5–8)
PLATELET # BLD AUTO: 163 K/UL (ref 150–450)
PLATELET # BLD AUTO: 168 K/UL (ref 150–450)
PLATELET # BLD AUTO: 186 K/UL (ref 150–450)
POTASSIUM SERPL-SCNC: 4.2 MMOL/L (ref 3.5–5.1)
PROT SERPL-MCNC: 6.6 G/DL (ref 6.3–8.2)
PT BLD: 10.8 SEC (ref 9–12)
SODIUM SERPL-SCNC: 143 MMOL/L (ref 137–145)
SP GR UR: 1.01 (ref 1–1.03)
UROBILINOGEN UR QL STRIP: <2 MG/DL (ref ?–2)
WBC # BLD AUTO: 6.7 K/UL (ref 3.8–10.6)
WBC # BLD AUTO: 6.8 K/UL (ref 3.8–10.6)
WBC # BLD AUTO: 6.8 K/UL (ref 3.8–10.6)

## 2018-10-29 PROCEDURE — 43239 EGD BIOPSY SINGLE/MULTIPLE: CPT

## 2018-10-29 PROCEDURE — 88305 TISSUE EXAM BY PATHOLOGIST: CPT

## 2018-10-29 PROCEDURE — 36415 COLL VENOUS BLD VENIPUNCTURE: CPT

## 2018-10-29 PROCEDURE — 96376 TX/PRO/DX INJ SAME DRUG ADON: CPT

## 2018-10-29 PROCEDURE — 45382 COLONOSCOPY W/CONTROL BLEED: CPT

## 2018-10-29 PROCEDURE — 80048 BASIC METABOLIC PNL TOTAL CA: CPT

## 2018-10-29 PROCEDURE — 45385 COLONOSCOPY W/LESION REMOVAL: CPT

## 2018-10-29 PROCEDURE — 45380 COLONOSCOPY AND BIOPSY: CPT

## 2018-10-29 PROCEDURE — 99285 EMERGENCY DEPT VISIT HI MDM: CPT

## 2018-10-29 PROCEDURE — 96374 THER/PROPH/DIAG INJ IV PUSH: CPT

## 2018-10-29 PROCEDURE — 93005 ELECTROCARDIOGRAM TRACING: CPT

## 2018-10-29 PROCEDURE — 96361 HYDRATE IV INFUSION ADD-ON: CPT

## 2018-10-29 PROCEDURE — 85730 THROMBOPLASTIN TIME PARTIAL: CPT

## 2018-10-29 PROCEDURE — 82272 OCCULT BLD FECES 1-3 TESTS: CPT

## 2018-10-29 PROCEDURE — 80053 COMPREHEN METABOLIC PANEL: CPT

## 2018-10-29 PROCEDURE — 85025 COMPLETE CBC W/AUTO DIFF WBC: CPT

## 2018-10-29 PROCEDURE — 85610 PROTHROMBIN TIME: CPT

## 2018-10-29 PROCEDURE — 85027 COMPLETE CBC AUTOMATED: CPT

## 2018-10-29 PROCEDURE — 81003 URINALYSIS AUTO W/O SCOPE: CPT

## 2018-10-29 RX ADMIN — ATENOLOL SCH MG: 25 TABLET ORAL at 21:13

## 2018-10-29 RX ADMIN — PANTOPRAZOLE SODIUM SCH MG: 40 INJECTION, POWDER, FOR SOLUTION INTRAVENOUS at 21:13

## 2018-10-29 RX ADMIN — CYCLOBENZAPRINE HYDROCHLORIDE SCH MG: 10 TABLET, FILM COATED ORAL at 21:13

## 2018-10-29 NOTE — ED
General Adult HPI





- General


Chief complaint: GI Bleed


Stated complaint: GI BLEED


Time Seen by Provider: 10/29/18 07:50


Source: patient, family, RN notes reviewed


Mode of arrival: ambulatory


Limitations: no limitations





- History of Present Illness


Initial comments: 





Patient 78-year-old male seen.  Past medical history for MAGDA justice, status post 

hip surgery times one month, presented to the emergency room today with a chief 

complaint of blood in his stool.  Patient does admit that he noticed some blood 

yesterday.  He states that today he had looser stool that was bright red blood.

  Patient denies any other complaints or symptoms.  Does admit that he is on a 

blood thinner of Xarelto.  Patient denies any other complaints or symptoms 

currently. Patient denies any recent fever, chills, shortness of breath, chest 

pain, back pain, abdominal pain, nausea or vomiting, numbness or tingling, 

headaches or visual changes, or any other complaints.





- Related Data


 Home Medications











 Medication  Instructions  Recorded  Confirmed


 


Allopurinol [Zyloprim] 300 mg PO DAILY 05/31/18 10/29/18


 


Atenolol 25 mg PO BID 05/31/18 10/29/18


 


Ranitidine HCl 150 mg PO BID 05/31/18 10/29/18


 


Tamsulosin HCl [Flomax] 0.4 mg PO DAILY 05/31/18 10/29/18


 


Cholecalciferol (Vitamin D3) 2,000 unit PO DAILY 09/13/18 10/29/18





[Vitamin D3]   


 


Rivaroxaban [Xarelto] 15 mg PO DAILY 09/13/18 10/29/18


 


Cyclobenzaprine [Flexeril] 10 mg PO HS 10/29/18 10/29/18








 Previous Rx's











 Medication  Instructions  Recorded


 


amLODIPine [Norvasc] 5 mg PO DAILY #30 tab 06/02/18


 


HYDROcodone/APAP 5-325MG [Norco 1 - 2 tab PO Q4-6H PRN #45 tab 09/26/18





5-325]  


 


Sennosides [Senokot] 1 tab PO BID #60 tablet 09/26/18











 Allergies











Allergy/AdvReac Type Severity Reaction Status Date / Time


 


No Known Allergies Allergy   Verified 10/29/18 08:06














Review of Systems


ROS Statement: 


Those systems with pertinent positive or pertinent negative responses have been 

documented in the HPI.





ROS Other: All systems not noted in ROS Statement are negative.





Past Medical History


Past Medical History: Atrial Fibrillation, COPD, Hyperlipidemia, Hypertension, 

Prostate Disorder, Renal Disease


Additional Past Medical History / Comment(s): gout, PSA elevated, acute renal 

failure since 4/2018


History of Any Multi-Drug Resistant Organisms: None Reported


Past Surgical History: Back Surgery


Additional Past Surgical History / Comment(s): 4 back surgeries


Past Anesthesia/Blood Transfusion Reactions: No Reported Reaction


Past Psychological History: No Psychological Hx Reported


Smoking Status: Heavy tobacco smoker


Past Alcohol Use History: None Reported


Past Drug Use History: None Reported





- Past Family History


  ** Mother


Family Medical History: Cancer


Additional Family Medical History / Comment(s): cirriosis





  ** Father


Family Medical History: No Reported History





  ** Brother(s)


Family Medical History: Cancer


Additional Family Medical History / Comment(s): COLON CANCER





General Exam





- General Exam Comments


Initial Comments: 





General:  The patient is awake and alert, in no distress, and does not appear 

acutely ill. 


Eye:  There is normal conjunctiva bilaterally.  No signs of icterus.  


Ears, nose, mouth and throat:  There are moist mucous membranes and no oral 

lesions. 


Neck:  The neck is supple, there is no tenderness or JVD.  


Cardiovascular:  There is a regular rate and rhythm. No murmur, rub or gallop 

is appreciated.


Respiratory:  Lungs are clear to auscultation, respirations are non-labored, 

breath sounds are equal.  No wheezes, stridor, rales, or rhonchi.


Gastrointestinal:  Soft, non-distended, non-tender abdomen without masses or 

organomegaly noted. There is no rebound or guarding present.  No CVA 

tenderness. 


Musculoskeletal:  Normal ROM, no tenderness.  Sensation intact.


Neurological:  A&O x 3. CN II-XII intact, There are no obvious motor or sensory 

deficits. Coordination appears grossly intact. Speech is normal.


Skin:  Skin is warm and dry and no rashes or lesions are noted. 


Psychiatric:  Cooperative, appropriate mood & affect, normal judgment. 


: Normal rectal tone.  Guaiac positive. 


Limitations: no limitations





Course


 Vital Signs











  10/29/18 10/29/18 10/29/18





  07:34 07:43 08:00


 


Temperature 97.6 F  


 


Pulse Rate 103 H  93


 


Respiratory 18  20





Rate   


 


Blood Pressure 170/86  140/104


 


O2 Sat by Pulse 99 92 L 93 L





Oximetry   














EKG Findings





- EKG Comments:


EKG Findings:: EKG performed at 0749: Shows A. fib with RVR to 102 bpm.  QRS 96 

QT//492.  No acute ST changes





Medical Decision Making





- Medical Decision Making





Patient's guaiac positive.  Patient's hemoglobin stable.  Vitals are stable.  

Patient has had bright red blood in stool beginning yesterday.  No bloody bowel 

movement here in the emergency room.  Patient will be admitted for serial H&H.





- Lab Data


Result diagrams: 


 10/29/18 07:58





 10/29/18 07:58


 Lab Results











  10/29/18 10/29/18 10/29/18 Range/Units





  07:58 07:58 07:58 


 


WBC  6.7    (3.8-10.6)  k/uL


 


RBC  4.38    (4.30-5.90)  m/uL


 


Hgb  13.2    (13.0-17.5)  gm/dL


 


Hct  40.4    (39.0-53.0)  %


 


MCV  92.4  D    (80.0-100.0)  fL


 


MCH  30.1    (25.0-35.0)  pg


 


MCHC  32.6    (31.0-37.0)  g/dL


 


RDW  15.1    (11.5-15.5)  %


 


Plt Count  186  D    (150-450)  k/uL


 


Neutrophils %  69    %


 


Lymphocytes %  19    %


 


Monocytes %  7    %


 


Eosinophils %  4    %


 


Basophils %  0    %


 


Neutrophils #  4.6    (1.3-7.7)  k/uL


 


Lymphocytes #  1.2    (1.0-4.8)  k/uL


 


Monocytes #  0.5    (0-1.0)  k/uL


 


Eosinophils #  0.2    (0-0.7)  k/uL


 


Basophils #  0.0    (0-0.2)  k/uL


 


Hypochromasia  Slight    


 


Poikilocytosis  Slight    


 


PT     (9.0-12.0)  sec


 


INR     (<1.2)  


 


APTT     (22.0-30.0)  sec


 


Sodium   143   (137-145)  mmol/L


 


Potassium   4.2   (3.5-5.1)  mmol/L


 


Chloride   111 H   ()  mmol/L


 


Carbon Dioxide   23   (22-30)  mmol/L


 


Anion Gap   9   mmol/L


 


BUN   21 H   (9-20)  mg/dL


 


Creatinine   1.22   (0.66-1.25)  mg/dL


 


Est GFR (CKD-EPI)AfAm   66   (>60 ml/min/1.73 sqM)  


 


Est GFR (CKD-EPI)NonAf   57   (>60 ml/min/1.73 sqM)  


 


Glucose   116 H   (74-99)  mg/dL


 


Calcium   9.5   (8.4-10.2)  mg/dL


 


Total Bilirubin   0.4   (0.2-1.3)  mg/dL


 


AST   15 L   (17-59)  U/L


 


ALT   15 L   (21-72)  U/L


 


Alkaline Phosphatase   80   ()  U/L


 


Total Protein   6.6   (6.3-8.2)  g/dL


 


Albumin   3.7   (3.5-5.0)  g/dL


 


Stool Occult Blood    Positive  (Negative)  














  10/29/18 Range/Units





  07:58 


 


WBC   (3.8-10.6)  k/uL


 


RBC   (4.30-5.90)  m/uL


 


Hgb   (13.0-17.5)  gm/dL


 


Hct   (39.0-53.0)  %


 


MCV   (80.0-100.0)  fL


 


MCH   (25.0-35.0)  pg


 


MCHC   (31.0-37.0)  g/dL


 


RDW   (11.5-15.5)  %


 


Plt Count   (150-450)  k/uL


 


Neutrophils %   %


 


Lymphocytes %   %


 


Monocytes %   %


 


Eosinophils %   %


 


Basophils %   %


 


Neutrophils #   (1.3-7.7)  k/uL


 


Lymphocytes #   (1.0-4.8)  k/uL


 


Monocytes #   (0-1.0)  k/uL


 


Eosinophils #   (0-0.7)  k/uL


 


Basophils #   (0-0.2)  k/uL


 


Hypochromasia   


 


Poikilocytosis   


 


PT  10.8  (9.0-12.0)  sec


 


INR  1.1  (<1.2)  


 


APTT  28.5  (22.0-30.0)  sec


 


Sodium   (137-145)  mmol/L


 


Potassium   (3.5-5.1)  mmol/L


 


Chloride   ()  mmol/L


 


Carbon Dioxide   (22-30)  mmol/L


 


Anion Gap   mmol/L


 


BUN   (9-20)  mg/dL


 


Creatinine   (0.66-1.25)  mg/dL


 


Est GFR (CKD-EPI)AfAm   (>60 ml/min/1.73 sqM)  


 


Est GFR (CKD-EPI)NonAf   (>60 ml/min/1.73 sqM)  


 


Glucose   (74-99)  mg/dL


 


Calcium   (8.4-10.2)  mg/dL


 


Total Bilirubin   (0.2-1.3)  mg/dL


 


AST   (17-59)  U/L


 


ALT   (21-72)  U/L


 


Alkaline Phosphatase   ()  U/L


 


Total Protein   (6.3-8.2)  g/dL


 


Albumin   (3.5-5.0)  g/dL


 


Stool Occult Blood   (Negative)  














Disposition


Clinical Impression: 


 GI bleed





Disposition: ADMITTED AS IP TO THIS HOSP


Condition: Stable


Is patient prescribed a controlled substance at d/c from ED?: No


Referrals: 


[facility] [Primary Care Provider] - 1-2 days


Time of Disposition: 09:18

## 2018-10-29 NOTE — P.CONS
History of Present Illness





- Reason for Consult


Consult date: 10/29/18


GI bleed


Requesting physician: Milind Barr





- Chief Complaint


Rectal bleeding





- History of Present Illness


78-year-old gentleman history of atrial fibrillation maintained on Xarelto, 

left total hip surgery 1 month ago presents with painless rectal bleeding 

burgundy red-colored bowel movements 1 day.  Denies fever chills, hematemesis 

or melena.  No history of GI bleeding.  Patient passed 3 mildly blood tinged 

bowel movements yesterday described as burgundy in color.  Patient passed a 

larger bowel movement this morning.  No history of colonoscopy or GI bleeding.  

Denies weight loss.  No NSAIDs or aspirin.








Admission hemoglobin 13.2 previously 13 1 month ago.  MCV 92 platelet 186.  INR 

1.1.  BUN 21.  Creatinine 1.2.  FOBT positive.








Review of Systems


Constitutional: Denies fever, chills, sweats, weight gain, or loss.


HEENT: Negative for migraines, blurred vision or loss, earaches, drainage, 

tinnitus, oral mucosal lesions, dysphagia, or odynophagia.


Cardiac: Negative for chest pain, arrhythmias, or palpitation.


Respiratory: Negative for shortness of breath, hemoptysis, cough, or sputum 

production.


Gastrointestinal: See HPI for pertinent findings.


Genitourinary: Negative for hematuria, urgency, frequency, polyuria, dysuria, 

or penile discharge.


Musculoskeletal: Negative for muscle aches, swelling, arthritis, and 

arthralgias.  


Neurologic: Negative for stroke or TIA.


Endocrine: Negative for thyroid problems.


Skin: Negative for rash or itching.


Psychiatric: Negative history for depression and anxiety








Past Medical History


Past Medical History: Atrial Fibrillation, Cancer, COPD, Hyperlipidemia, 

Hypertension, Osteoarthritis (OA), Prostate Disorder, Renal Disease, Skin 

Disorder


Additional Past Medical History / Comment(s): 1960s had bleeding stomach ulcer 

with surgery, acute renal failure 4/2018, elevated PSA, gout bilateral ankles 

and toes both feet, chronic back pain-does not tolerate supine position, 

psoriasis, skin cancer with removals.


History of Any Multi-Drug Resistant Organisms: None Reported


Past Surgical History: Appendectomy, Back Surgery, Joint Replacement


Additional Past Surgical History / Comment(s): 9/25/18 total L hip arthroplasty

, gastric surgery for bleeding ulcer, 4 back surgeries, skin cancer removals


Past Anesthesia/Blood Transfusion Reactions: No Reported Reaction


Smoking Status: Current every day smoker





- Past Family History


  ** Mother


Family Medical History: Cancer, Skin Disorder


Additional Family Medical History / Comment(s): Psoriasis, unknown to pt type 

of cancer.





  ** Father


Family Medical History: No Reported History


Additional Family Medical History / Comment(s): Father was healthy





  ** Brother(s)


Family Medical History: Cancer


Additional Family Medical History / Comment(s): COLON CANCER





Medications and Allergies


 Home Medications











 Medication  Instructions  Recorded  Confirmed  Type


 


Allopurinol [Zyloprim] 300 mg PO DAILY 05/31/18 10/29/18 History


 


Atenolol 25 mg PO BID 05/31/18 10/29/18 History


 


Ranitidine HCl 150 mg PO BID 05/31/18 10/29/18 History


 


Tamsulosin HCl [Flomax] 0.4 mg PO DAILY 05/31/18 10/29/18 History


 


amLODIPine [Norvasc] 5 mg PO DAILY #30 tab 06/02/18 10/29/18 Rx


 


Cholecalciferol (Vitamin D3) 2,000 unit PO DAILY 09/13/18 10/29/18 History





[Vitamin D3]    


 


Rivaroxaban [Xarelto] 15 mg PO DAILY 09/13/18 10/29/18 History


 


HYDROcodone/APAP 5-325MG [Norco 1 - 2 tab PO Q4-6H PRN #45 tab 09/26/18 10/29/

18 Rx





5-325]    


 


Sennosides [Senokot] 1 tab PO BID #60 tablet 09/26/18 10/29/18 Rx


 


Cyclobenzaprine [Flexeril] 10 mg PO HS 10/29/18 10/29/18 History











 Allergies











Allergy/AdvReac Type Severity Reaction Status Date / Time


 


No Known Allergies Allergy   Verified 10/29/18 08:06














Physical Exam


Vitals: 


 Vital Signs











  Temp Pulse Resp BP Pulse Ox


 


 10/29/18 09:53   80  18  146/95  93 L


 


 10/29/18 09:00   98  23  146/100  90 L


 


 10/29/18 08:30   80  16  120/87  94 L


 


 10/29/18 08:00   93  20  140/104  93 L


 


 10/29/18 07:43      92 L


 


 10/29/18 07:34  97.6 F  103 H  18  170/86  99








 Intake and Output











 10/28/18 10/29/18 10/29/18





 22:59 06:59 14:59


 


Other:   


 


  Weight   90.718 kg











General appearance: The patient is alert, oriented, in no acute distress.


HET: Head is normocephalic and atraumatic.  Pupils are equal and reactive.  

Oropharynx is clear without lesions.


Neck: Supple without lymphadenopathy.  Trachea midline.


Heart: S1 S2.  Regular rate and rhythm.


Lungs: No crackles or wheezes are heard.


Abdomen: Soft, nontender, nondistended with  bowel sounds.  No peritoneal 

signs.  No palpable organomegaly or masses.


Extremities: Normal skin color and turgor.  No cyanosis, rash, ulceration, 

clubbing, or edema.  Radial and pedal pulses are 2/4 bilaterally.


Neurological: No focal deficits.  Strength and sensation are grossly intact.








Results


CBC & Chem 7: 


 10/29/18 13:53





 10/29/18 07:58


Labs: 


 Abnormal Lab Results - Last 24 Hours (Table)











  10/29/18 Range/Units





  07:58 


 


Chloride  111 H  ()  mmol/L


 


BUN  21 H  (9-20)  mg/dL


 


Glucose  116 H  (74-99)  mg/dL


 


AST  15 L  (17-59)  U/L


 


ALT  15 L  (21-72)  U/L














Assessment and Plan


(1) Hematochezia


Narrative/Plan: 


78-year-old gentleman admitted with acute lower GI bleed painless rectal 

bleeding recent total hip surgery 1 month ago contained on anticoagulation 

possible colonic diverticular bleeding.  Upper GI source cannot be entirely 

excluded but felt to be less likely.


Current Visit: Yes   Status: Acute   Code(s): K92.1 - MELENA   SNOMED Code(s): 

492060076


   





(2) GI bleed


Current Visit: Yes   Status: Acute   Code(s): K92.2 - GASTROINTESTINAL 

HEMORRHAGE, UNSPECIFIED   SNOMED Code(s): 36877378


   





(3) Status post total hip replacement, left


Current Visit: No   Status: Acute   Code(s): Z96.642 - PRESENCE OF LEFT 

ARTIFICIAL HIP JOINT   SNOMED Code(s): 668878406157


   


Plan: 


1.  Clear liquid diet.  CBC at noon as well as daily monitoring.  Protonix 40 

mg daily.  Tagged RBC scan recommended if bleeding tolerates.


2.  Hold anticoagulation.  Inpatient endoscopic exam will be contingent on 

clinical course possibly as early as Wednesday.  We'll follow closely with you.








Thank you for this kind referral and the opportunity to participate in the care 

of your patient.  This consultation was discussed with Dr. Kay.  The 

impression and plan of care have been directed as dictated.

## 2018-10-29 NOTE — P.HPIM
History of Present Illness


H&P Date: 10/29/18


Chief Complaint: Blood in the stool





Patient is a 78-year-old male with a known history of atrial fibrillation on 

anticoagulation with xarelto, left hip arthroplasty about 1 month ago, COPD, 

hypertension, osteoarthritis and other multiple medical problems came to ER 

with complaints of rectal bleeding.  Patient says that he noticed blueberry 

shaped bleeding spots in the stool last night and again today.  Patient does 

have maroon-colored spots in the stool.  Denied any abdominal pain.  Patient 

did have 2-3 episodes of diarrhea.  Denied fever or chills.  Denied any 

hematemesis.  FOBT is positive


Hemoglobin 13.2 on admission.


Patient had a history of stomach ulcer with surgery in 1960s.  No recent GI 

bleed or abdominal issues.


Patient has been taking anticoagulations just over a month for atrial 

fibrillation, diagnosed before his hip surgery.


Currently denied any complains of headache or dizziness or lightheadedness.  No 

active bleeding at this time.





EKG showed atrial fibrillation with rapid regular rate of 102





Review of Systems





Constitutional: Patient denies any fever or chills .  No generalized weakness 

or weight loss.  


Abdomen: Patient denied nausea vomiting and diarrhea and abdominal pain.  Blood 

in the stool.


Cardiovascular: Patient denies any chest pain or short of breath no 

palpitations.


Respiratory: patient denied any cough is from production.  No shortness of 

breath


Neurologic: Patient denied any numbness or tingling headache.


Musculoskeletal: Patient denies any complaints of joint swelling or deformity.


Skin: Negative


Psychiatric: Negative


Endocrine: No heat or cold intolerance.  No recent weight gain.


Genitourinary: No dysuria or hematuria.


All other 14 point ROS negative except the above





Past Medical History


Past Medical History: Atrial Fibrillation, Cancer, COPD, Hyperlipidemia, 

Hypertension, Osteoarthritis (OA), Prostate Disorder, Renal Disease, Skin 

Disorder


Additional Past Medical History / Comment(s): 1960s had bleeding stomach ulcer 

with surgery, acute renal failure 4/2018, elevated PSA, gout bilateral ankles 

and toes both feet, chronic back pain-does not tolerate supine position, 

psoriasis, skin cancer with removals.


History of Any Multi-Drug Resistant Organisms: None Reported


Past Surgical History: Appendectomy, Back Surgery, Joint Replacement


Additional Past Surgical History / Comment(s): 9/25/18 total L hip arthroplasty

, gastric surgery for bleeding ulcer, 4 back surgeries, skin cancer removals


Past Anesthesia/Blood Transfusion Reactions: No Reported Reaction


Smoking Status: Current every day smoker





- Past Family History


  ** Mother


Family Medical History: Cancer, Skin Disorder


Additional Family Medical History / Comment(s): Psoriasis, unknown to pt type 

of cancer.





  ** Father


Family Medical History: No Reported History


Additional Family Medical History / Comment(s): Father was healthy





  ** Brother(s)


Family Medical History: Cancer


Additional Family Medical History / Comment(s): COLON CANCER





Medications and Allergies


 Home Medications











 Medication  Instructions  Recorded  Confirmed  Type


 


Allopurinol [Zyloprim] 300 mg PO DAILY 05/31/18 10/29/18 History


 


Atenolol 25 mg PO BID 05/31/18 10/29/18 History


 


Ranitidine HCl 150 mg PO BID 05/31/18 10/29/18 History


 


Tamsulosin HCl [Flomax] 0.4 mg PO DAILY 05/31/18 10/29/18 History


 


amLODIPine [Norvasc] 5 mg PO DAILY #30 tab 06/02/18 10/29/18 Rx


 


Cholecalciferol (Vitamin D3) 2,000 unit PO DAILY 09/13/18 10/29/18 History





[Vitamin D3]    


 


Rivaroxaban [Xarelto] 15 mg PO DAILY 09/13/18 10/29/18 History


 


HYDROcodone/APAP 5-325MG [Norco 1 - 2 tab PO Q4-6H PRN #45 tab 09/26/18 10/29/

18 Rx





5-325]    


 


Sennosides [Senokot] 1 tab PO BID #60 tablet 09/26/18 10/29/18 Rx


 


Cyclobenzaprine [Flexeril] 10 mg PO HS 10/29/18 10/29/18 History











 Allergies











Allergy/AdvReac Type Severity Reaction Status Date / Time


 


No Known Allergies Allergy   Verified 10/29/18 08:06














Physical Exam


Vitals: 


 Vital Signs











  Temp Pulse Resp BP Pulse Ox


 


 10/29/18 09:53   80  18  146/95  93 L


 


 10/29/18 09:00   98  23  146/100  90 L


 


 10/29/18 08:30   80  16  120/87  94 L


 


 10/29/18 08:00   93  20  140/104  93 L


 


 10/29/18 07:43      92 L


 


 10/29/18 07:34  97.6 F  103 H  18  170/86  99








 Intake and Output











 10/28/18 10/29/18 10/29/18





 22:59 06:59 14:59


 


Other:   


 


  Weight   90.718 kg














PHYSICAL EXAMINATION: 


Patient is lying in the bed comfortably, no acute distress, awake alert and 

oriented.. 


HEENT: Normocephalic. Neck is supple. Pupils reactive. Nostrils clear. Oral 

cavity is moist. Ears reveal no drainage. 


Neck reveals no JVD, carotid bruits, or thyromegaly. 


CHEST EXAMINATION: Trachea is central. Symmetrical expansion. Lung fields clear 

to auscultation and percussion. 


CARDIAC: Normal S1, S2 with no gallops. No murmurs .  Irregularly irregular 

pulse


ABDOMEN: Soft. Bowel sounds normal. No organomegaly. No abdominal bruits. 


Extremities: reveal no edema.  No clubbing or cyanosis


Neurologically awake, alert, oriented x3 with well-coordinated movements.  No 

focal deficits noted


Skin: No rash or skin lesions. 


Psychiatric: Coperative.  Nonsuicidal


Musculoskeletal: No joint swelling or deformity.  Normal range of motion.








Results


CBC & Chem 7: 


 10/29/18 13:53





 10/29/18 07:58


Labs: 


 Abnormal Lab Results - Last 24 Hours (Table)











  10/29/18 Range/Units





  07:58 


 


Chloride  111 H  ()  mmol/L


 


BUN  21 H  (9-20)  mg/dL


 


Glucose  116 H  (74-99)  mg/dL


 


AST  15 L  (17-59)  U/L


 


ALT  15 L  (21-72)  U/L














Thrombosis Risk Factor Assmnt





- Choose All That Apply


Any of the Below Risk Factors Present?: Yes


Each Factor Represents 1 point: Abnormal pulmonary function (COPD), Obesity (

BMI >25)


Other Risk Factors: Yes


Each Risk Factor Represents 3 Points: Age 75 years or older


Other congenital or acquired thrombophilia - If yes, enter type in comment: No


Thrombosis Risk Factor Assessment Total Risk Factor Score: 5


Thrombosis Risk Factor Assessment Level: High Risk

## 2018-10-30 LAB
ALBUMIN SERPL-MCNC: 3.8 G/DL (ref 3.5–5)
ALP SERPL-CCNC: 88 U/L (ref 38–126)
ALT SERPL-CCNC: 18 U/L (ref 21–72)
ANION GAP SERPL CALC-SCNC: 7 MMOL/L
AST SERPL-CCNC: 18 U/L (ref 17–59)
BASOPHILS # BLD AUTO: 0 K/UL (ref 0–0.2)
BASOPHILS NFR BLD AUTO: 0 %
BUN SERPL-SCNC: 14 MG/DL (ref 9–20)
CALCIUM SPEC-MCNC: 9.6 MG/DL (ref 8.4–10.2)
CHLORIDE SERPL-SCNC: 109 MMOL/L (ref 98–107)
CO2 SERPL-SCNC: 27 MMOL/L (ref 22–30)
EOSINOPHIL # BLD AUTO: 0.3 K/UL (ref 0–0.7)
EOSINOPHIL NFR BLD AUTO: 4 %
ERYTHROCYTE [DISTWIDTH] IN BLOOD BY AUTOMATED COUNT: 4.16 M/UL (ref 4.3–5.9)
ERYTHROCYTE [DISTWIDTH] IN BLOOD BY AUTOMATED COUNT: 4.4 M/UL (ref 4.3–5.9)
ERYTHROCYTE [DISTWIDTH] IN BLOOD: 14.9 % (ref 11.5–15.5)
ERYTHROCYTE [DISTWIDTH] IN BLOOD: 15 % (ref 11.5–15.5)
GLUCOSE SERPL-MCNC: 90 MG/DL (ref 74–99)
HCT VFR BLD AUTO: 38.8 % (ref 39–53)
HCT VFR BLD AUTO: 41.2 % (ref 39–53)
HGB BLD-MCNC: 12.3 GM/DL (ref 13–17.5)
HGB BLD-MCNC: 13.3 GM/DL (ref 13–17.5)
LYMPHOCYTES # SPEC AUTO: 1.4 K/UL (ref 1–4.8)
LYMPHOCYTES NFR SPEC AUTO: 19 %
MCH RBC QN AUTO: 29.7 PG (ref 25–35)
MCH RBC QN AUTO: 30.3 PG (ref 25–35)
MCHC RBC AUTO-ENTMCNC: 31.8 G/DL (ref 31–37)
MCHC RBC AUTO-ENTMCNC: 32.3 G/DL (ref 31–37)
MCV RBC AUTO: 93.4 FL (ref 80–100)
MCV RBC AUTO: 93.8 FL (ref 80–100)
MONOCYTES # BLD AUTO: 0.5 K/UL (ref 0–1)
MONOCYTES NFR BLD AUTO: 7 %
NEUTROPHILS # BLD AUTO: 4.9 K/UL (ref 1.3–7.7)
NEUTROPHILS NFR BLD AUTO: 68 %
PLATELET # BLD AUTO: 154 K/UL (ref 150–450)
PLATELET # BLD AUTO: 154 K/UL (ref 150–450)
POTASSIUM SERPL-SCNC: 4.3 MMOL/L (ref 3.5–5.1)
PROT SERPL-MCNC: 6.7 G/DL (ref 6.3–8.2)
SODIUM SERPL-SCNC: 143 MMOL/L (ref 137–145)
WBC # BLD AUTO: 7 K/UL (ref 3.8–10.6)
WBC # BLD AUTO: 7.2 K/UL (ref 3.8–10.6)

## 2018-10-30 RX ADMIN — TAMSULOSIN HYDROCHLORIDE SCH MG: 0.4 CAPSULE ORAL at 09:24

## 2018-10-30 RX ADMIN — ATENOLOL SCH MG: 25 TABLET ORAL at 09:22

## 2018-10-30 RX ADMIN — ATENOLOL SCH MG: 25 TABLET ORAL at 21:52

## 2018-10-30 RX ADMIN — ALLOPURINOL SCH MG: 300 TABLET ORAL at 09:20

## 2018-10-30 RX ADMIN — CYCLOBENZAPRINE HYDROCHLORIDE SCH MG: 10 TABLET, FILM COATED ORAL at 21:52

## 2018-10-30 RX ADMIN — PANTOPRAZOLE SODIUM SCH MG: 40 INJECTION, POWDER, FOR SOLUTION INTRAVENOUS at 09:23

## 2018-10-30 RX ADMIN — Medication SCH UNIT: at 09:22

## 2018-10-30 RX ADMIN — PANTOPRAZOLE SODIUM SCH MG: 40 INJECTION, POWDER, FOR SOLUTION INTRAVENOUS at 21:52

## 2018-10-30 NOTE — P.PN
Subjective


Progress Note Date: 10/30/18


Principal diagnosis: 


hematochezia GI bleed





78-year-old maleadmitted with painless hematochezia.  2 smaller burgundy 

colored bowel movements yesterday.  hemoglobin 12.3.  Denies abdominal pain.  

Afebrile.  No emesis.





Objective





- Vital Signs


Vital signs: 


 Vital Signs











Temp  98 F   10/30/18 05:00


 


Pulse  80   10/30/18 05:00


 


Resp  16   10/30/18 05:00


 


BP  120/64   10/30/18 05:00


 


Pulse Ox  93 L  10/30/18 05:00








 Intake & Output











 10/29/18 10/30/18 10/30/18





 18:59 06:59 18:59


 


Intake Total  1180 


 


Balance  1180 


 


Weight 90.718 kg  


 


Intake:   


 


  Intake, IV Titration  800 





  Amount   


 


    Sodium Chloride 0.9% 1,  800 





    000 ml @ 100 mls/hr IV .   





    Q10H ONE Rx#:[id number]   


 


  Oral  380 


 


Other:   


 


  Voiding Method  Toilet 


 


  # Voids  1 


 


  # Bowel Movements 1  














- Exam


General appearance: The patient is alert, oriented, in no acute distress.


HET: Head is normocephalic and atraumatic.  Pupils are equal and reactive.  

Oropharynx is clear without lesions.


Neck: Supple without lymphadenopathy.  Trachea midline.


Heart: S1 S2. 


Lungs: No crackles or wheezes are heard.


Abdomen: Soft, nontender, nondistended with  bowel sounds.  No peritoneal 

signs.  No palpable organomegaly or masses.


Extremities: Normal skin color and turgor.  No cyanosis, rash, ulceration, 

clubbing, or edema.  Radial and pedal pulses are 2/4 bilaterally.


Neurological: No focal deficits.  Strength and sensation are grossly intact.








- Labs


CBC & Chem 7: 


 10/30/18 00:10





 10/29/18 07:58


Labs: 


 Abnormal Lab Results - Last 24 Hours (Table)











  10/29/18 10/30/18 Range/Units





  16:57 00:10 


 


RBC  4.24 L  4.16 L  (4.30-5.90)  m/uL


 


Hgb  12.7 L  12.3 L  (13.0-17.5)  gm/dL


 


Hct   38.8 L  (39.0-53.0)  %














Assessment and Plan


(1) Hematochezia


Narrative/Plan: 


78-year-old gentleman admitted with acute lower GI bleed painless rectal 

bleeding recent total hip surgery 1 month ago contained on anticoagulation 

possible colonic diverticular bleeding.  Upper GI source cannot be entirely 

excluded but felt to be less likely.


Current Visit: Yes   Status: Acute   Code(s): K92.1 - MELENA   SNOMED Code(s): 

394534950


   





(2) GI bleed


Current Visit: Yes   Status: Acute   Code(s): K92.2 - GASTROINTESTINAL 

HEMORRHAGE, UNSPECIFIED   SNOMED Code(s): 42711234


   





(3) Status post total hip replacement, left


Current Visit: No   Status: Acute   Code(s): Z96.642 - PRESENCE OF LEFT 

ARTIFICIAL HIP JOINT   SNOMED Code(s): 875280833572


   


Plan: 


1.  EGD colonoscopy tomorrow.  CBC monitoring.  Continue GI prophylaxis.  

Nothing by mouth after midnight.  Clear liquids today.





Assessment and plan a care discussed with Dr. Kay

## 2018-10-31 RX ADMIN — Medication SCH UNIT: at 08:34

## 2018-10-31 RX ADMIN — TAMSULOSIN HYDROCHLORIDE SCH MG: 0.4 CAPSULE ORAL at 08:34

## 2018-10-31 RX ADMIN — PANTOPRAZOLE SODIUM SCH MG: 40 INJECTION, POWDER, FOR SOLUTION INTRAVENOUS at 08:34

## 2018-10-31 RX ADMIN — ATENOLOL SCH MG: 25 TABLET ORAL at 08:34

## 2018-10-31 RX ADMIN — ATENOLOL SCH MG: 25 TABLET ORAL at 22:57

## 2018-10-31 RX ADMIN — CYCLOBENZAPRINE HYDROCHLORIDE SCH MG: 10 TABLET, FILM COATED ORAL at 22:57

## 2018-10-31 RX ADMIN — PANTOPRAZOLE SODIUM SCH MG: 40 INJECTION, POWDER, FOR SOLUTION INTRAVENOUS at 22:57

## 2018-10-31 RX ADMIN — ALLOPURINOL SCH MG: 300 TABLET ORAL at 08:34

## 2018-10-31 NOTE — P.PN
Subjective


Progress Note Date: 10/31/18


Progress note being dictated for Dr. Barr.











Interval history:Patient is a 78-year-old male with a known history of atrial 

fibrillation on anticoagulation with xarelto, left hip arthroplasty about 1 

month ago, COPD, hypertension, osteoarthritis and other multiple medical 

problems came to ER with complaints of rectal bleeding.  Patient says that he 

noticed blueberry shaped bleeding spots in the stool last night and again 

today.  Patient does have maroon-colored spots in the stool.  Denied any 

abdominal pain.  Patient did have 2-3 episodes of diarrhea.  Denied fever or 

chills.  Denied any hematemesis.  FOBT is positive


Hemoglobin 13.2 on admission.


Patient had a history of stomach ulcer with surgery in 1960s.  No recent GI 

bleed or abdominal issues.


Patient has been taking anticoagulations just over a month for atrial 

fibrillation, diagnosed before his hip surgery.


Currently denied any complains of headache or dizziness or lightheadedness.  No 

active bleeding at this time.





EKG showed atrial fibrillation with rapid regular rate of 102








10/30/2018  reporting a couple of small burgundy colored, movements yesterday, 

hemoglobin 12.3.  Tolerating clear liquid diet. Denies nausea, vomiting, 

abdominal pain. evaluated by GI, scheduled for both EGD and colonoscopy 

tomorrow.  Currently drinking prep.  Afebrile.  Denies chest pain, palpitations 

or increased shortness of breath.





10/31/18 NPO,Awaiting EGD& Colonscopy today.Completed Prep, staff reports 

clear. No further bleeding reported. Denies chest pain, palpitations, shortness 

of breath.





Objective





- Vital Signs


Vital signs: 


 Vital Signs











Temp  96.7 F L  10/31/18 17:42


 


Pulse  95   10/31/18 17:42


 


Resp  20   10/31/18 17:42


 


BP  138/78   10/31/18 17:42


 


Pulse Ox  96   10/31/18 17:42








 Intake & Output











 10/31/18 10/31/18 11/01/18





 06:59 18:59 06:59


 


Intake Total 1000 840 


 


Balance 1000 840 


 


Intake:   


 


  IV  600 


 


  Oral 1000 240 


 


Other:   


 


  Voiding Method Toilet Toilet 


 


  # Voids 4 3 


 


  # Bowel Movements 5  














- Exam


Patient is lying in the bed comfortably, no acute distress, awake alert and 

oriented.. 


HEENT: Normocephalic. Neck is supple. Pupils reactive. Nostrils clear. Oral 

cavity is moist.  


Neck reveals no JVD, carotid bruits, or thyromegaly. 


CHEST EXAMINATION: Trachea is central. Symmetrical expansion. Lung fields clear 

to auscultation and percussion. 


CARDIAC: Normal S1, S2 with no gallops. No murmurs .  Irregularly irregular 

pulse


ABDOMEN: Soft.  Nontender, nondistended. Bowel sounds normal. No organomegaly. 

No abdominal bruits. 


Extremities: reveal no edema.  No clubbing or cyanosis


Neurologically awake, alert, oriented x3 with well-coordinated movements.  No 

focal deficits noted


Skin: No rash or skin lesions. 


Psychiatric: Coperative.  Nonsuicidal


Musculoskeletal: No joint swelling or deformity.  Normal range of motion.











- Labs


CBC & Chem 7: 


 10/30/18 08:19





 10/30/18 08:19





Assessment and Plan


Assessment: 


Acute GI bleed/rectal bleed.  Possible diverticular bleed.


Atrial fibrillation with rapid ventricular rate, currently controlled.  On 

anticoagulation at home.


Recent hip surgery


COPD


Hypertension


Hyperlipidemia


Osteoarthritis


BPH


Remote History of stomach ulcer with surgery in 1960s


Chronic back pain


Skin cancer with removal


Nicotine addiction and currently everyday smoker


DVT prophylaxis with SCDs














Plan: Continue on current medication regime ,monitoring and symptomatic 

treatment.  EGD and colonoscopy pending. NPO.  Discharge planning in progress 

pending results.








The impression and plan of care has been dictated as directed.





:


I performed a history and examination of this patient,  discussed the same with 

the dictator.  I agree with the dictator's note ,documented as a scribe.  Any 

additional findings or plans will be noted.

## 2018-10-31 NOTE — P.PCN
Date of Procedure: 10/31/18


Description of Procedure: 


Brief history:


Patient is a pleasant scheduled for an upper endoscopy as well as colonoscopy 

as a part of evaluation of rectal bleeding which brought the patient to the 

hospital.  The patient is on Xarelto at home and this was held for 2 days prior 

to endoscopic evaluation.





Procedure performed:


Esophagogastroduodenoscopy with cold biopsy


Colonoscopy with polypectomy and clip placement





Estimated blood loss:


Minimal.





Preoperative diagnosis:


Hematochezia, anemia of acute blood loss, GERD





Anesthesia: 


MAC





Procedure:


After informed consent was obtained from the patient  was brought into the 

endoscopy unit and IV  sedation was administered by anesthesia under continuous 

monitoring.  Initially upper endoscopy was done.  The Olympus  video 

endoscope was inserted into the mouth and esophagus intubated without any 

difficulty and was gradually advanced into the stomach and duodenum and 

carefully examined.  The bulb and second part of the duodenum appeared normal.  

The scope was then withdrawn into the stomach adequately insufflated with air 

and upon careful examination  the antrum and body, cardia and fundus appeared 

normal with some mild scattered erythema in the antrum and body which was 

biopsied.  The scope was then withdrawn into the esophagus.  The GE junction 

was located at 45 cm to the incisors.  It appeared regular with no erythema 

erosions or ulcerations.  Rest of the esophagus appeared normal.  Patient 

tolerated the procedure well.





At this time the patient continued to remain sedation.  Initial digital rectal 

examination was significant for external nonthrombosed hemorrhoids.  Olympus CF 

190 video colonoscope was then inserted into the rectum and gradually advanced 

to the cecum without any difficulty.  Careful examination was performed as the 

scope was gradually being withdrawn.  The prep was good.  A 4 mm sessile polyp 

at the appendiceal opening was removed with cold snare.  An 8 mm sessile polyp 

in the cecum was removed with cold snare.  A distal descending colon polyp 

measuring 5 mm was removed with cold snare.  A mid descending polyp measuring 7 

mm which was sessile was removed with cold snare.  A 4 mm hepatic flexure 

sessile polyp was removed with cold snare to transverse colon sessile polyps 

measuring 2-3 mm were removed with cold forceps.  A 6 mm sessile proximal 

sigmoid polyp was removed via multiple bites with cold forceps.  2 sessile 

sigmoid polyps measuring 2-3 mm were removed with cold forceps.  A diminutive 3 

mm rectal polyp was removed with cold forceps.  A large 2 cm pedunculated 

rectal polyp was removed with hot snare.  3 clips were placed at the site of 

the hot snare polypectomy with hemostasis achieved.  Mild scattered 

diverticulosis was noted throughout the colon.  Patient tolerated the procedure 

well.





Impression:


1.  Gastritis, biopsied.


2.  Multiple polyps removed throughout the colon with cold forceps, cold snare 

and hot snare.  3 clips placed for hemostasis in the rectum where a large 

pedunculated polyp was removed with hot snare.


3.  Diverticulosis.








Recommendations:


Findings of this examination were discussed with the patient as well as his 

wife.  Okay for liquid diet today.  We'll continue to hold Xarelto and monitor 

hemoglobin and hematocrit, for a minimum of 48 additional hours in the size of 

polypectomy.  Await pathology.  Patient will need follow-up for discussion on 

further treatment.  Repeat colonoscopy will depend on findings of pathology.

## 2018-10-31 NOTE — P.PN
Subjective


Progress Note Date: 10/30/18


Progress note being dictated for Dr. Barr.











Interval history:Patient is a 78-year-old male with a known history of atrial 

fibrillation on anticoagulation with xarelto, left hip arthroplasty about 1 

month ago, COPD, hypertension, osteoarthritis and other multiple medical 

problems came to ER with complaints of rectal bleeding.  Patient says that he 

noticed blueberry shaped bleeding spots in the stool last night and again 

today.  Patient does have maroon-colored spots in the stool.  Denied any 

abdominal pain.  Patient did have 2-3 episodes of diarrhea.  Denied fever or 

chills.  Denied any hematemesis.  FOBT is positive


Hemoglobin 13.2 on admission.


Patient had a history of stomach ulcer with surgery in 1960s.  No recent GI 

bleed or abdominal issues.


Patient has been taking anticoagulations just over a month for atrial 

fibrillation, diagnosed before his hip surgery.


Currently denied any complains of headache or dizziness or lightheadedness.  No 

active bleeding at this time.





EKG showed atrial fibrillation with rapid regular rate of 102








10/30/2018  reporting a couple of small burgundy colored, movements yesterday, 

hemoglobin 12.3.  Tolerating clear liquid diet. Denies nausea, vomiting, 

abdominal pain. evaluated by GI, scheduled for both EGD and colonoscopy 

tomorrow.  Currently drinking prep.  Afebrile.  Denies chest pain, palpitations 

or increased shortness of breath.





Objective





- Vital Signs


Vital signs: 


 Vital Signs











Temp  98 F   10/30/18 05:00


 


Pulse  71   10/30/18 08:45


 


Resp  18   10/30/18 08:45


 


BP  116/66   10/30/18 08:45


 


Pulse Ox  93 L  10/30/18 05:00








 Intake & Output











 10/29/18 10/30/18 10/30/18





 18:59 06:59 18:59


 


Intake Total  1180 250


 


Balance  1180 250


 


Weight 90.718 kg  


 


Intake:   


 


  Intake, IV Titration  800 





  Amount   


 


    Sodium Chloride 0.9% 1,  800 





    000 ml @ 100 mls/hr IV .   





    Q10H ONE Rx#:[id number]   


 


  Oral  380 250


 


Other:   


 


  Voiding Method  Toilet Toilet


 


  # Voids  1 1


 


  # Bowel Movements 1  














- Exam


Patient is lying in the bed comfortably, no acute distress, awake alert and 

oriented.. 


HEENT: Normocephalic. Neck is supple. Pupils reactive. Nostrils clear. Oral 

cavity is moist. Ears reveal no drainage. 


Neck reveals no JVD, carotid bruits, or thyromegaly. 


CHEST EXAMINATION: Trachea is central. Symmetrical expansion. Lung fields clear 

to auscultation and percussion. 


CARDIAC: Normal S1, S2 with no gallops. No murmurs .  Irregularly irregular 

pulse


ABDOMEN: Soft.  Nontender, nondistended. Bowel sounds normal. No organomegaly. 

No abdominal bruits. 


Extremities: reveal no edema.  No clubbing or cyanosis


Neurologically awake, alert, oriented x3 with well-coordinated movements.  No 

focal deficits noted


Skin: No rash or skin lesions. 


Psychiatric: Coperative.  Nonsuicidal


Musculoskeletal: No joint swelling or deformity.  Normal range of motion.











- Labs


CBC & Chem 7: 


 10/30/18 08:19





 10/30/18 08:19


Labs: 


 Abnormal Lab Results - Last 24 Hours (Table)











  10/29/18 10/30/18 10/30/18 Range/Units





  16:57 00:10 08:19 


 


RBC  4.24 L  4.16 L   (4.30-5.90)  m/uL


 


Hgb  12.7 L  12.3 L   (13.0-17.5)  gm/dL


 


Hct   38.8 L   (39.0-53.0)  %


 


Chloride    109 H  ()  mmol/L


 


ALT    18 L  (21-72)  U/L














Assessment and Plan


Assessment: 


Acute GI bleed/rectal bleed.  Possible diverticular bleed.


Atrial fibrillation with rapid ventricular rate, currently controlled.  On 

anticoagulation at home.


Recent hip surgery


COPD


Hypertension


Hyperlipidemia


Osteoarthritis


BPH


Remote History of stomach ulcer with surgery in 1960s


Chronic back pain


Skin cancer with removal


Nicotine addiction and currently everyday smoker


DVT prophylaxis with SCDs














Plan: Continue on current medication regime ,monitoring and symptomatic 

treatment.  Currently undergoing prep for both EGD and colonoscopy scheduled 

for tomorrow.  Close monitoring of CBC, and further bleeding.NPO at midnight.  

Potential discharge tomorrow after endoscopies completed pending results.  

Smoking cessation readdressed. Further recommendations to follow.











The impression and plan of care has been dictated as directed.





:


I performed a history and examination of this patient,  discussed the same with 

the dictator.  I agree with the dictator's note ,documented as a scribe.  Any 

additional findings or plans will be noted.

## 2018-11-01 LAB
ANION GAP SERPL CALC-SCNC: 9 MMOL/L
BASOPHILS # BLD AUTO: 0 K/UL (ref 0–0.2)
BASOPHILS NFR BLD AUTO: 0 %
BUN SERPL-SCNC: 10 MG/DL (ref 9–20)
CALCIUM SPEC-MCNC: 9.1 MG/DL (ref 8.4–10.2)
CHLORIDE SERPL-SCNC: 111 MMOL/L (ref 98–107)
CO2 SERPL-SCNC: 23 MMOL/L (ref 22–30)
EOSINOPHIL # BLD AUTO: 0.2 K/UL (ref 0–0.7)
EOSINOPHIL NFR BLD AUTO: 3 %
ERYTHROCYTE [DISTWIDTH] IN BLOOD BY AUTOMATED COUNT: 4.2 M/UL (ref 4.3–5.9)
ERYTHROCYTE [DISTWIDTH] IN BLOOD: 14.8 % (ref 11.5–15.5)
GLUCOSE SERPL-MCNC: 88 MG/DL (ref 74–99)
HCT VFR BLD AUTO: 39 % (ref 39–53)
HGB BLD-MCNC: 12.6 GM/DL (ref 13–17.5)
LYMPHOCYTES # SPEC AUTO: 1 K/UL (ref 1–4.8)
LYMPHOCYTES NFR SPEC AUTO: 14 %
MCH RBC QN AUTO: 29.9 PG (ref 25–35)
MCHC RBC AUTO-ENTMCNC: 32.2 G/DL (ref 31–37)
MCV RBC AUTO: 93 FL (ref 80–100)
MONOCYTES # BLD AUTO: 0.5 K/UL (ref 0–1)
MONOCYTES NFR BLD AUTO: 7 %
NEUTROPHILS # BLD AUTO: 5.5 K/UL (ref 1.3–7.7)
NEUTROPHILS NFR BLD AUTO: 75 %
PLATELET # BLD AUTO: 141 K/UL (ref 150–450)
POTASSIUM SERPL-SCNC: 3.8 MMOL/L (ref 3.5–5.1)
SODIUM SERPL-SCNC: 143 MMOL/L (ref 137–145)
WBC # BLD AUTO: 7.3 K/UL (ref 3.8–10.6)

## 2018-11-01 RX ADMIN — Medication SCH UNIT: at 10:24

## 2018-11-01 RX ADMIN — PANTOPRAZOLE SODIUM SCH MG: 40 INJECTION, POWDER, FOR SOLUTION INTRAVENOUS at 10:38

## 2018-11-01 RX ADMIN — TAMSULOSIN HYDROCHLORIDE SCH MG: 0.4 CAPSULE ORAL at 10:25

## 2018-11-01 RX ADMIN — ATENOLOL SCH MG: 25 TABLET ORAL at 10:24

## 2018-11-01 RX ADMIN — ALLOPURINOL SCH MG: 300 TABLET ORAL at 10:26

## 2018-11-01 RX ADMIN — ATENOLOL SCH MG: 25 TABLET ORAL at 21:06

## 2018-11-01 RX ADMIN — CYCLOBENZAPRINE HYDROCHLORIDE SCH MG: 10 TABLET, FILM COATED ORAL at 21:05

## 2018-11-01 RX ADMIN — PANTOPRAZOLE SODIUM SCH MG: 40 INJECTION, POWDER, FOR SOLUTION INTRAVENOUS at 21:06

## 2018-11-02 VITALS
TEMPERATURE: 97.7 F | RESPIRATION RATE: 16 BRPM | HEART RATE: 86 BPM | DIASTOLIC BLOOD PRESSURE: 70 MMHG | SYSTOLIC BLOOD PRESSURE: 110 MMHG

## 2018-11-02 LAB
BASOPHILS # BLD AUTO: 0 K/UL (ref 0–0.2)
BASOPHILS NFR BLD AUTO: 0 %
EOSINOPHIL # BLD AUTO: 0.2 K/UL (ref 0–0.7)
EOSINOPHIL NFR BLD AUTO: 4 %
ERYTHROCYTE [DISTWIDTH] IN BLOOD BY AUTOMATED COUNT: 4.14 M/UL (ref 4.3–5.9)
ERYTHROCYTE [DISTWIDTH] IN BLOOD: 14.8 % (ref 11.5–15.5)
HCT VFR BLD AUTO: 39.1 % (ref 39–53)
HGB BLD-MCNC: 12.2 GM/DL (ref 13–17.5)
LYMPHOCYTES # SPEC AUTO: 0.9 K/UL (ref 1–4.8)
LYMPHOCYTES NFR SPEC AUTO: 14 %
MCH RBC QN AUTO: 29.4 PG (ref 25–35)
MCHC RBC AUTO-ENTMCNC: 31.2 G/DL (ref 31–37)
MCV RBC AUTO: 94.4 FL (ref 80–100)
MONOCYTES # BLD AUTO: 0.5 K/UL (ref 0–1)
MONOCYTES NFR BLD AUTO: 7 %
NEUTROPHILS # BLD AUTO: 4.8 K/UL (ref 1.3–7.7)
NEUTROPHILS NFR BLD AUTO: 74 %
PLATELET # BLD AUTO: 121 K/UL (ref 150–450)
WBC # BLD AUTO: 6.5 K/UL (ref 3.8–10.6)

## 2018-11-02 RX ADMIN — Medication SCH UNIT: at 07:55

## 2018-11-02 RX ADMIN — ATENOLOL SCH MG: 25 TABLET ORAL at 07:56

## 2018-11-02 RX ADMIN — PANTOPRAZOLE SODIUM SCH MG: 40 INJECTION, POWDER, FOR SOLUTION INTRAVENOUS at 07:58

## 2018-11-02 RX ADMIN — ALLOPURINOL SCH MG: 300 TABLET ORAL at 07:55

## 2018-11-02 RX ADMIN — TAMSULOSIN HYDROCHLORIDE SCH MG: 0.4 CAPSULE ORAL at 07:57

## 2018-11-02 NOTE — P.PN
Subjective


Progress Note Date: 11/01/18


Progress note being dictated for Dr. Barr.











Interval history:Patient is a 78-year-old male with a known history of atrial 

fibrillation on anticoagulation with xarelto, left hip arthroplasty about 1 

month ago, COPD, hypertension, osteoarthritis and other multiple medical 

problems came to ER with complaints of rectal bleeding.  Patient says that he 

noticed blueberry shaped bleeding spots in the stool last night and again 

today.  Patient does have maroon-colored spots in the stool.  Denied any 

abdominal pain.  Patient did have 2-3 episodes of diarrhea.  Denied fever or 

chills.  Denied any hematemesis.  FOBT is positive


Hemoglobin 13.2 on admission.


Patient had a history of stomach ulcer with surgery in 1960s.  No recent GI 

bleed or abdominal issues.


Patient has been taking anticoagulations just over a month for atrial 

fibrillation, diagnosed before his hip surgery.


Currently denied any complains of headache or dizziness or lightheadedness.  No 

active bleeding at this time.





EKG showed atrial fibrillation with rapid regular rate of 102








10/30/2018  reporting a couple of small burgundy colored, movements yesterday, 

hemoglobin 12.3.  Tolerating clear liquid diet. Denies nausea, vomiting, 

abdominal pain. evaluated by GI, scheduled for both EGD and colonoscopy 

tomorrow.  Currently drinking prep.  Afebrile.  Denies chest pain, palpitations 

or increased shortness of breath.





10/31/18 NPO,Awaiting EGD& Colonscopy today.Completed Prep, staff reports 

clear. No further bleeding reported. Denies chest pain, palpitations, shortness 

of breath.











11/1/18  underwent EGD with biopsy and colonoscopy with polypectomy and clip 

placement yesterday, tolerated procedures well.  Multiple polyps removed within 

the colon with 3 clips placed.  Gastritis and diverticulosis 

reported.Tolerating soft diet, with no nausea vomiting or diarrhea.  No further 

bleeding, no hematochezia.  Hemoglobin stable at 12.6.  Denies lightheadedness 

dizziness or focal deficits.  Denies chest pain, palpitations or increasing 

shortness of breath.





Objective





- Vital Signs


Vital signs: 


 Vital Signs











Temp  97.7 F   11/02/18 12:07


 


Pulse  86   11/02/18 12:07


 


Resp  16   11/02/18 12:07


 


BP  110/70   11/02/18 12:07


 


Pulse Ox  98   11/02/18 12:07








 Intake & Output











 11/02/18 11/02/18 11/03/18





 06:59 18:59 06:59


 


Intake Total  720 


 


Balance  720 


 


Weight 90.718 kg 90.718 kg 


 


Intake:   


 


  Oral  720 


 


Other:   


 


  Voiding Method Toilet Toilet 


 


  # Voids 1 4 














- Exam


Patient is lying in the bed comfortably, no acute distress, awake alert and 

oriented.. 


HEENT: Normocephalic. Neck is supple. Pupils reactive. Nostrils clear. Oral 

cavity is moist.  


Neck reveals no JVD, carotid bruits, or thyromegaly. 


CHEST EXAMINATION: Trachea is central. Symmetrical expansion. Lung fields clear 

to auscultation and percussion. 


CARDIAC: Normal S1, S2 with no gallops. No murmurs .  Irregularly irregular 

pulse


ABDOMEN: Soft.  Nontender, nondistended. Bowel sounds normal. No organomegaly. 

No abdominal bruits. 


Extremities: reveal no edema.  No clubbing or cyanosis


Neurologically awake, alert, oriented x3 with well-coordinated movements.  No 

focal deficits noted


Skin: No rash or skin lesions. 


Psychiatric: Coperative.  Nonsuicidal


Musculoskeletal: No joint swelling or deformity.  Normal range of motion.











- Labs


CBC & Chem 7: 


 11/02/18 07:24





 11/01/18 07:02


Labs: 


 Abnormal Lab Results - Last 24 Hours (Table)











  11/02/18 Range/Units





  07:24 


 


RBC  4.14 L  (4.30-5.90)  m/uL


 


Hgb  12.2 L  (13.0-17.5)  gm/dL


 


Plt Count  121 L  (150-450)  k/uL


 


Lymphocytes #  0.9 L  (1.0-4.8)  k/uL














Assessment and Plan


Assessment: 


Acute GI bleed/rectal bleed.  Status post EGD, colonoscopy reporting gastritis, 

diverticulosis.


Atrial fibrillation with rapid ventricular rate, currently controlled.  Xarelto 

on hold


Recent hip surgery


COPD


Hypertension


Hyperlipidemia


Osteoarthritis


BPH


Remote History of stomach ulcer with surgery in 1960s


Chronic back pain


Skin cancer with removal


Nicotine addiction and currently everyday smoker


DVT prophylaxis with SCDs














Plan: Continue on current medication regime ,monitoring and symptomatic 

treatment.  Continue monitoring of hematocrit, hemoglobin, hold Xarelto as per 

GI. Discharge planning in progress for tomorrow.








The impression and plan of care has been dictated as directed.





:


I performed a history and examination of this patient,  discussed the same with 

the dictator.  I agree with the dictator's note ,documented as a scribe.  Any 

additional findings or plans will be noted.

## 2018-11-02 NOTE — P.DS
Providers


Date of admission: 


10/29/18 08:51





Expected date of discharge: 11/02/18


Attending physician: 


Milind Spicer


Consults: 





 





10/29/18 08:51


Consult Physician Urgent 


   Consulting Provider: Shawna Holly


   Consult Reason/Comments: gi hemorrhage


   Do you want consulting provider notified?: Yes











Primary care physician: 


[facility]





Hospital Course: 


Final Diagnoses:


Acute GI bleed/rectal bleed.  Status post EGD, colonoscopy with polypectomy, 

reporting gastritis, diverticulosis.


Atrial fibrillation with rapid ventricular rate, currently controlled.  Xarelto 

on hold until Monday.


Recent hip surgery


COPD


Hypertension


Hyperlipidemia


Osteoarthritis


BPH


Remote History of stomach ulcer with surgery in 1960s


Chronic back pain


Skin cancer with removal


Nicotine addiction and currently everyday smoker








Hospital course:Patient is a 78-year-old male with a known history of atrial 

fibrillation on anticoagulation with xarelto, left hip arthroplasty about 1 

month ago, COPD, hypertension, osteoarthritis and other multiple medical 

problems came to ER with complaints of rectal bleeding.  Patient says that he 

noticed blueberry shaped bleeding spots in the stool last night and again 

today.  Patient does have maroon-colored spots in the stool.  Denied any 

abdominal pain.  Patient did have 2-3 episodes of diarrhea.  Denied fever or 

chills.  Denied any hematemesis.  FOBT is positive


Hemoglobin 13.2 on admission.


Patient had a history of stomach ulcer with surgery in 1960s.  No recent GI 

bleed or abdominal issues.


Patient has been taking anticoagulations just over a month for atrial 

fibrillation, diagnosed before his hip surgery.


Currently denied any complains of headache or dizziness or lightheadedness.  No 

active bleeding at this time.





EKG showed atrial fibrillation with rapid regular rate of 102








Evaluated by GI.  underwent EGD with biopsy and colonoscopy with polypectomy 

and clip placement. Multiple polyps removed within the colon with 3 clips 

placed.  Gastritis and diverticulosis reported. Hemoglobin stable at 12.6.  

Significant clinical improvement.  Patient cleared by GI for discharge.  

Patient is being discharged home in a stable condition with guarded prognosis.











The impression and plan of care has been dictated as directed.





:


I performed a history and examination of this patient,  discussed the same with 

the dictator.  I agree with the dictator's note ,documented as a scribe.  Any 

additional findings or plans will be noted.











Time taken: 35 minutes





Patient Condition at Discharge: Stable





Plan - Discharge Summary


Discharge Rx Participant: No


New Discharge Prescriptions: 


New


   Pantoprazole Sodium [Protonix] 40 mg PO BID #60 tablet.





Continue


   Tamsulosin HCl [Flomax] 0.4 mg PO DAILY


   Allopurinol [Zyloprim] 300 mg PO DAILY


   Atenolol 25 mg PO BID


   amLODIPine [Norvasc] 5 mg PO DAILY #30 tab


   Cholecalciferol (Vitamin D3) [Vitamin D3] 2,000 unit PO DAILY


   Sennosides [Senokot] 1 tab PO BID #60 tablet


   HYDROcodone/APAP 5-325MG [Norco 5-325] 1 - 2 tab PO Q4-6H PRN #45 tab


     PRN Reason: Pain


   Cyclobenzaprine [Flexeril] 10 mg PO HS


   Rivaroxaban [Xarelto] 15 mg PO DAILY #0





Discontinued


   Ranitidine HCl 150 mg PO BID


Discharge Medication List





Allopurinol [Zyloprim] 300 mg PO DAILY 05/31/18 [History]


Atenolol 25 mg PO BID 05/31/18 [History]


Tamsulosin HCl [Flomax] 0.4 mg PO DAILY 05/31/18 [History]


amLODIPine [Norvasc] 5 mg PO DAILY #30 tab 06/02/18 [Rx]


Cholecalciferol (Vitamin D3) [Vitamin D3] 2,000 unit PO DAILY 09/13/18 [History]


HYDROcodone/APAP 5-325MG [Norco 5-325] 1 - 2 tab PO Q4-6H PRN #45 tab 09/26/18 [

Rx]


Sennosides [Senokot] 1 tab PO BID #60 tablet 09/26/18 [Rx]


Cyclobenzaprine [Flexeril] 10 mg PO HS 10/29/18 [History]


Pantoprazole Sodium [Protonix] 40 mg PO BID #60 tablet. 11/02/18 [Rx]


Rivaroxaban [Xarelto] 15 mg PO DAILY #0 11/02/18 [Rx]








Follow up Appointment(s)/Referral(s): 


Raymond Kay MD [STAFF PHYSICIAN] - 11/20/18 1:30 pm


[facility] [Primary Care Provider] - 11/05/18 2:30 pm (Appointment will be 

with THEODORA Phillips)


Ambulatory/Diagnostic Orders: 


Complete Blood Count w/diff [LAB.AMB] Time Frame: 3 Days, Location: None 

Selected


Activity/Diet/Wound Care/Special Instructions: 


Continue to hold Xarelto until 11/05/2018.  May restart Xarelto on Monday, 11/05 /2018.


Discharge Disposition: HOME SELF-CARE

## 2019-04-23 ENCOUNTER — HOSPITAL ENCOUNTER (OUTPATIENT)
Dept: HOSPITAL 47 - CATHCVL | Age: 79
LOS: 1 days | Discharge: HOME | End: 2019-04-24
Attending: INTERNAL MEDICINE
Payer: MEDICARE

## 2019-04-23 VITALS — BODY MASS INDEX: 27.8 KG/M2

## 2019-04-23 VITALS — RESPIRATION RATE: 18 BRPM

## 2019-04-23 DIAGNOSIS — I48.0: ICD-10-CM

## 2019-04-23 DIAGNOSIS — I10: ICD-10-CM

## 2019-04-23 DIAGNOSIS — I34.0: ICD-10-CM

## 2019-04-23 DIAGNOSIS — F17.210: ICD-10-CM

## 2019-04-23 DIAGNOSIS — Z79.01: ICD-10-CM

## 2019-04-23 DIAGNOSIS — M10.9: ICD-10-CM

## 2019-04-23 DIAGNOSIS — Z71.6: ICD-10-CM

## 2019-04-23 DIAGNOSIS — I25.110: Primary | ICD-10-CM

## 2019-04-23 DIAGNOSIS — I27.20: ICD-10-CM

## 2019-04-23 DIAGNOSIS — Z79.899: ICD-10-CM

## 2019-04-23 LAB
ANION GAP SERPL CALC-SCNC: 9 MMOL/L
BASOPHILS # BLD AUTO: 0 K/UL (ref 0–0.2)
BASOPHILS NFR BLD AUTO: 0 %
BUN SERPL-SCNC: 22 MG/DL (ref 9–20)
CALCIUM SPEC-MCNC: 9.4 MG/DL (ref 8.4–10.2)
CHLORIDE SERPL-SCNC: 105 MMOL/L (ref 98–107)
CO2 SERPL-SCNC: 28 MMOL/L (ref 22–30)
EOSINOPHIL # BLD AUTO: 0.2 K/UL (ref 0–0.7)
EOSINOPHIL NFR BLD AUTO: 3 %
ERYTHROCYTE [DISTWIDTH] IN BLOOD BY AUTOMATED COUNT: 5.14 M/UL (ref 4.3–5.9)
ERYTHROCYTE [DISTWIDTH] IN BLOOD: 16.4 % (ref 11.5–15.5)
GLUCOSE SERPL-MCNC: 110 MG/DL (ref 74–99)
HCT VFR BLD AUTO: 43.6 % (ref 39–53)
HGB BLD-MCNC: 14 GM/DL (ref 13–17.5)
LYMPHOCYTES # SPEC AUTO: 1.3 K/UL (ref 1–4.8)
LYMPHOCYTES NFR SPEC AUTO: 18 %
MCH RBC QN AUTO: 27.3 PG (ref 25–35)
MCHC RBC AUTO-ENTMCNC: 32.2 G/DL (ref 31–37)
MCV RBC AUTO: 84.7 FL (ref 80–100)
MONOCYTES # BLD AUTO: 0.5 K/UL (ref 0–1)
MONOCYTES NFR BLD AUTO: 6 %
NEUTROPHILS # BLD AUTO: 5.2 K/UL (ref 1.3–7.7)
NEUTROPHILS NFR BLD AUTO: 71 %
PLATELET # BLD AUTO: 135 K/UL (ref 150–450)
POTASSIUM SERPL-SCNC: 4.5 MMOL/L (ref 3.5–5.1)
SODIUM SERPL-SCNC: 142 MMOL/L (ref 137–145)
WBC # BLD AUTO: 7.4 K/UL (ref 3.8–10.6)

## 2019-04-23 PROCEDURE — 93458 L HRT ARTERY/VENTRICLE ANGIO: CPT

## 2019-04-23 PROCEDURE — 80048 BASIC METABOLIC PNL TOTAL CA: CPT

## 2019-04-23 PROCEDURE — 85025 COMPLETE CBC W/AUTO DIFF WBC: CPT

## 2019-04-23 RX ADMIN — ATENOLOL SCH MG: 25 TABLET ORAL at 20:42

## 2019-04-23 RX ADMIN — VERAPAMIL HYDROCHLORIDE ONE ML: 2.5 INJECTION, SOLUTION INTRAVENOUS at 12:39

## 2019-04-23 RX ADMIN — VERAPAMIL HYDROCHLORIDE ONE ML: 2.5 INJECTION, SOLUTION INTRAVENOUS at 13:18

## 2019-04-23 NOTE — CC
CARDIAC CATHETERIZATION REPORT



DATE OF SERVICE:

04/23/2019



PERFORMING PHYSICIAN:

Bharat Coronado MD, Interventional Cardiologist.



PROCEDURE:

1. Selective right and left coronary angiogram.

2. Left heart catheterization.

3. Successful stenting of the OM2 of left circumflex using 2.0 x 15 mm Munith drug-

    eluting stent with good angiographic results.



INDICATION:

This is a 79-year-old gentleman with history of hypertension and dyslipidemia who was

experiencing shortness of breath with exertion and underwent myocardial perfusion

imaging stress test and that revealed ischemia.  Because of that, a heart

catheterization was advised.



APPROACH:

Right radial artery.



COMPLICATION:

None.



LEVEL OF SEDATION:

Moderate with sedation length of 43 minutes.



PROCEDURE DESCRIPTION:

After obtaining an informed consent, the patient was brought to cardiac cath lab.  The

right radial artery was cannulated using micropuncture technique. The micropuncture

wire passed easily, then I placed a 6-Dutch sheath 11 cm in the right radial artery.



After that, I did selective right and left coronary angiogram using JR4 and JL3.5

catheters.  Left heart catheterization was performed using the JR4 catheter which slid

into the LV, then I did pullback across aortic valve.



I did after that intervene on the left circumflex, please see a separate paragraph for

that.



SELECTIVE CORONARY ANGIOGRAM:

1. The right coronary artery is a medium caliber vessel.  It is a nondominant vessel

    and appeared to be angiographically normal.

2. The left main is angiographically normal.  It bifurcates into left circumflex and

    left anterior descending artery.

3. The left circumflex is a large caliber vessel, it is a dominant vessel.  The

    proximal circumflex appeared to be appeared to have mild disease only and gives

    rise into OM1, which appeared to be angiographically normal.  The mid circumflex

    has mild disease only and gives rise into a second OM which has a lesion distally

    appeared to be in the range of 99%.  The circumflex after that has intermediate

    lesion in the range of 50% to 60%..  Distally appeared to be angiographically

    normal.

4. The LAD, the proximal LAD appeared to have mild disease only.  The mid LAD appeared

    to have also mild disease only and the LAD distally appeared to be angiographically

    normal.  The LAD gives rise into multiple small diagonal branches.



HEMODYNAMICS:

The left ventricular end-diastolic pressure was 8 mmHg without significant gradient

across the aortic valve.



PCI OF LEFT CIRCUMFLEX:

Anticoagulation was initiated using Angiomax.  Subsequently, I did engage the left main

using JL3.5 guide.  A whisper wire was used to wire the left circumflex coronary

artery.  I did double wire the left circumflex using a run-through wire.  After that, I

did balloon angioplasty using 2.5 x 12 mm balloon before I deployed 2.0 x 15 mm Bill

drug-eluting stent where the stent was positioned under under fluoroscopy guidance and

deployed under the subsequently I deployed a 2.0 x 15 mm Bill drug-eluting stent where

the stent was positioned under fluoroscopy guidance and deployed under 18 atmospheres

for 20 seconds with the following angiogram showing good angiographic results and the

procedure was completed without any complication.



CONCLUSION:

1. Critical disease involving OM2 of left circumflex.

2. Successful stenting of OM2 of left circumflex using 2.0 x 15 mm Munith drug-eluting

    stent with good angiographic results and reduction of stenosis from 95% to 0%.



POSTPROCEDURE MANAGEMENT:

1. Maximize medical treatment.

2. Dual anti-platelet therapy.

3. Follow up with the patient.





MMPRAKASH / IJN: [id number] / Job#: [id number]

## 2019-04-24 VITALS — HEART RATE: 66 BPM | SYSTOLIC BLOOD PRESSURE: 131 MMHG | DIASTOLIC BLOOD PRESSURE: 64 MMHG | TEMPERATURE: 97.9 F

## 2019-04-24 LAB
ANION GAP SERPL CALC-SCNC: 6 MMOL/L
BASOPHILS # BLD AUTO: 0 K/UL (ref 0–0.2)
BASOPHILS NFR BLD AUTO: 0 %
BUN SERPL-SCNC: 20 MG/DL (ref 9–20)
CALCIUM SPEC-MCNC: 9.5 MG/DL (ref 8.4–10.2)
CHLORIDE SERPL-SCNC: 106 MMOL/L (ref 98–107)
CO2 SERPL-SCNC: 30 MMOL/L (ref 22–30)
EOSINOPHIL # BLD AUTO: 0.3 K/UL (ref 0–0.7)
EOSINOPHIL NFR BLD AUTO: 3 %
ERYTHROCYTE [DISTWIDTH] IN BLOOD BY AUTOMATED COUNT: 5.25 M/UL (ref 4.3–5.9)
ERYTHROCYTE [DISTWIDTH] IN BLOOD: 16.2 % (ref 11.5–15.5)
GLUCOSE SERPL-MCNC: 104 MG/DL (ref 74–99)
HCT VFR BLD AUTO: 44.8 % (ref 39–53)
HGB BLD-MCNC: 14.1 GM/DL (ref 13–17.5)
LYMPHOCYTES # SPEC AUTO: 1.6 K/UL (ref 1–4.8)
LYMPHOCYTES NFR SPEC AUTO: 20 %
MCH RBC QN AUTO: 26.8 PG (ref 25–35)
MCHC RBC AUTO-ENTMCNC: 31.4 G/DL (ref 31–37)
MCV RBC AUTO: 85.4 FL (ref 80–100)
MONOCYTES # BLD AUTO: 0.6 K/UL (ref 0–1)
MONOCYTES NFR BLD AUTO: 7 %
NEUTROPHILS # BLD AUTO: 5.5 K/UL (ref 1.3–7.7)
NEUTROPHILS NFR BLD AUTO: 68 %
PLATELET # BLD AUTO: 143 K/UL (ref 150–450)
POTASSIUM SERPL-SCNC: 4.4 MMOL/L (ref 3.5–5.1)
SODIUM SERPL-SCNC: 142 MMOL/L (ref 137–145)
WBC # BLD AUTO: 8.1 K/UL (ref 3.8–10.6)

## 2019-04-24 RX ADMIN — ATENOLOL SCH MG: 25 TABLET ORAL at 08:38

## 2019-04-24 NOTE — DS
DISCHARGE SUMMARY



ADMISSION DATE:

April 23, 2019.



DISCHARGE DATE:

April 24, 2018



BRIEF HISTORY:

This is a pleasant 79-year-old gentleman who underwent yesterday a heart

catheterization and stenting of the left circumflex.



On follow up with him today, he is doing good and he is asymptomatic.  The right wrist

is soft and nontender and without any bruises with good right radial pulse.



The patient is going to be discharged home on dual anti-platelet therapy and I will

follow up with the patient next week in the office.





MMDANNIEL / IJN: [id number] / Job#: [id number]

## 2020-11-09 ENCOUNTER — HOSPITAL ENCOUNTER (INPATIENT)
Dept: HOSPITAL 47 - EC | Age: 80
LOS: 3 days | Discharge: HOME | DRG: 268 | End: 2020-11-12
Attending: SURGERY | Admitting: SURGERY
Payer: MEDICARE

## 2020-11-09 DIAGNOSIS — Z79.82: ICD-10-CM

## 2020-11-09 DIAGNOSIS — Z90.49: ICD-10-CM

## 2020-11-09 DIAGNOSIS — Z87.19: ICD-10-CM

## 2020-11-09 DIAGNOSIS — R45.87: ICD-10-CM

## 2020-11-09 DIAGNOSIS — M54.9: ICD-10-CM

## 2020-11-09 DIAGNOSIS — Z80.0: ICD-10-CM

## 2020-11-09 DIAGNOSIS — I27.20: ICD-10-CM

## 2020-11-09 DIAGNOSIS — Z87.11: ICD-10-CM

## 2020-11-09 DIAGNOSIS — I13.10: ICD-10-CM

## 2020-11-09 DIAGNOSIS — Z84.0: ICD-10-CM

## 2020-11-09 DIAGNOSIS — Z96.642: ICD-10-CM

## 2020-11-09 DIAGNOSIS — J96.01: ICD-10-CM

## 2020-11-09 DIAGNOSIS — F17.210: ICD-10-CM

## 2020-11-09 DIAGNOSIS — N18.30: ICD-10-CM

## 2020-11-09 DIAGNOSIS — G47.33: ICD-10-CM

## 2020-11-09 DIAGNOSIS — E78.5: ICD-10-CM

## 2020-11-09 DIAGNOSIS — Z85.828: ICD-10-CM

## 2020-11-09 DIAGNOSIS — M19.90: ICD-10-CM

## 2020-11-09 DIAGNOSIS — I70.1: ICD-10-CM

## 2020-11-09 DIAGNOSIS — Z79.899: ICD-10-CM

## 2020-11-09 DIAGNOSIS — M10.9: ICD-10-CM

## 2020-11-09 DIAGNOSIS — I48.19: ICD-10-CM

## 2020-11-09 DIAGNOSIS — J96.02: ICD-10-CM

## 2020-11-09 DIAGNOSIS — L40.9: ICD-10-CM

## 2020-11-09 DIAGNOSIS — I71.3: Primary | ICD-10-CM

## 2020-11-09 DIAGNOSIS — I08.3: ICD-10-CM

## 2020-11-09 DIAGNOSIS — J44.9: ICD-10-CM

## 2020-11-09 DIAGNOSIS — Z98.890: ICD-10-CM

## 2020-11-09 DIAGNOSIS — I70.8: ICD-10-CM

## 2020-11-09 DIAGNOSIS — N40.0: ICD-10-CM

## 2020-11-09 DIAGNOSIS — I25.10: ICD-10-CM

## 2020-11-09 DIAGNOSIS — G89.29: ICD-10-CM

## 2020-11-09 DIAGNOSIS — J98.11: ICD-10-CM

## 2020-11-09 DIAGNOSIS — Z98.61: ICD-10-CM

## 2020-11-09 DIAGNOSIS — Z80.9: ICD-10-CM

## 2020-11-09 LAB
ALBUMIN SERPL-MCNC: 2.8 G/DL (ref 3.5–5)
ALBUMIN SERPL-MCNC: 4.8 G/DL (ref 3.5–5)
ALP SERPL-CCNC: 124 U/L (ref 38–126)
ALP SERPL-CCNC: 57 U/L (ref 38–126)
ALT SERPL-CCNC: 13 U/L (ref 4–49)
ALT SERPL-CCNC: 9 U/L (ref 4–49)
AMYLASE SERPL-CCNC: 64 U/L (ref 30–110)
ANION GAP SERPL CALC-SCNC: 10 MMOL/L
ANION GAP SERPL CALC-SCNC: 5 MMOL/L
APTT BLD: 25.6 SEC (ref 22–30)
AST SERPL-CCNC: 18 U/L (ref 17–59)
AST SERPL-CCNC: 19 U/L (ref 17–59)
BASOPHILS # BLD AUTO: 0 K/UL (ref 0–0.2)
BASOPHILS # BLD AUTO: 0 K/UL (ref 0–0.2)
BASOPHILS NFR BLD AUTO: 0 %
BASOPHILS NFR BLD AUTO: 0 %
BUN SERPL-SCNC: 16 MG/DL (ref 9–20)
BUN SERPL-SCNC: 19 MG/DL (ref 9–20)
CALCIUM SPEC-MCNC: 7.2 MG/DL (ref 8.4–10.2)
CALCIUM SPEC-MCNC: 9.6 MG/DL (ref 8.4–10.2)
CHLORIDE SERPL-SCNC: 103 MMOL/L (ref 98–107)
CHLORIDE SERPL-SCNC: 110 MMOL/L (ref 98–107)
CO2 BLDA-SCNC: 24 MMOL/L (ref 19–24)
CO2 BLDA-SCNC: 24 MMOL/L (ref 19–24)
CO2 BLDA-SCNC: 25 MMOL/L (ref 19–24)
CO2 BLDA-SCNC: 28 MMOL/L (ref 19–24)
CO2 BLDA-SCNC: 30 MMOL/L (ref 19–24)
CO2 SERPL-SCNC: 26 MMOL/L (ref 22–30)
CO2 SERPL-SCNC: 27 MMOL/L (ref 22–30)
EOSINOPHIL # BLD AUTO: 0 K/UL (ref 0–0.7)
EOSINOPHIL # BLD AUTO: 0.1 K/UL (ref 0–0.7)
EOSINOPHIL NFR BLD AUTO: 0 %
EOSINOPHIL NFR BLD AUTO: 1 %
ERYTHROCYTE [DISTWIDTH] IN BLOOD BY AUTOMATED COUNT: 3.59 M/UL (ref 4.3–5.9)
ERYTHROCYTE [DISTWIDTH] IN BLOOD BY AUTOMATED COUNT: 4.08 M/UL (ref 4.3–5.9)
ERYTHROCYTE [DISTWIDTH] IN BLOOD BY AUTOMATED COUNT: 5.66 M/UL (ref 4.3–5.9)
ERYTHROCYTE [DISTWIDTH] IN BLOOD: 14.6 % (ref 11.5–15.5)
ERYTHROCYTE [DISTWIDTH] IN BLOOD: 15.2 % (ref 11.5–15.5)
ERYTHROCYTE [DISTWIDTH] IN BLOOD: 15.9 % (ref 11.5–15.5)
GLUCOSE BLD-MCNC: 116 MG/DL (ref 75–99)
GLUCOSE BLD-MCNC: 133 MG/DL (ref 75–99)
GLUCOSE BLD-MCNC: 143 MG/DL (ref 75–99)
GLUCOSE SERPL-MCNC: 139 MG/DL (ref 74–99)
GLUCOSE SERPL-MCNC: 195 MG/DL (ref 74–99)
GLUCOSE UR QL: (no result)
HCO3 BLDA-SCNC: 23 MMOL/L (ref 21–25)
HCO3 BLDA-SCNC: 23 MMOL/L (ref 21–25)
HCO3 BLDA-SCNC: 24 MMOL/L (ref 21–25)
HCO3 BLDA-SCNC: 26 MMOL/L (ref 21–25)
HCO3 BLDA-SCNC: 27 MMOL/L (ref 21–25)
HCT VFR BLD AUTO: 31.5 % (ref 39–53)
HCT VFR BLD AUTO: 35.8 % (ref 39–53)
HCT VFR BLD AUTO: 50.2 % (ref 39–53)
HGB BLD-MCNC: 10.2 GM/DL (ref 13–17.5)
HGB BLD-MCNC: 11.7 GM/DL (ref 13–17.5)
HGB BLD-MCNC: 15.7 GM/DL (ref 13–17.5)
HYALINE CASTS UR QL AUTO: 1 /LPF (ref 0–2)
INR PPP: 1 (ref ?–1.2)
INR PPP: 1.1 (ref ?–1.2)
LIPASE SERPL-CCNC: 147 U/L (ref 23–300)
LYMPHOCYTES # SPEC AUTO: 0.8 K/UL (ref 1–4.8)
LYMPHOCYTES # SPEC AUTO: 1 K/UL (ref 1–4.8)
LYMPHOCYTES NFR SPEC AUTO: 5 %
LYMPHOCYTES NFR SPEC AUTO: 7 %
MCH RBC QN AUTO: 27.8 PG (ref 25–35)
MCH RBC QN AUTO: 28.4 PG (ref 25–35)
MCH RBC QN AUTO: 28.7 PG (ref 25–35)
MCHC RBC AUTO-ENTMCNC: 31.3 G/DL (ref 31–37)
MCHC RBC AUTO-ENTMCNC: 32.4 G/DL (ref 31–37)
MCHC RBC AUTO-ENTMCNC: 32.7 G/DL (ref 31–37)
MCV RBC AUTO: 87.8 FL (ref 80–100)
MCV RBC AUTO: 87.8 FL (ref 80–100)
MCV RBC AUTO: 88.7 FL (ref 80–100)
MONOCYTES # BLD AUTO: 0.6 K/UL (ref 0–1)
MONOCYTES # BLD AUTO: 1.1 K/UL (ref 0–1)
MONOCYTES NFR BLD AUTO: 4 %
MONOCYTES NFR BLD AUTO: 7 %
NEUTROPHILS # BLD AUTO: 12.5 K/UL (ref 1.3–7.7)
NEUTROPHILS # BLD AUTO: 14.7 K/UL (ref 1.3–7.7)
NEUTROPHILS NFR BLD AUTO: 85 %
NEUTROPHILS NFR BLD AUTO: 90 %
PCO2 BLDA: 40 MMHG (ref 35–45)
PCO2 BLDA: 41 MMHG (ref 35–45)
PCO2 BLDA: 44 MMHG (ref 35–45)
PCO2 BLDA: 63 MMHG (ref 35–45)
PCO2 BLDA: 82 MMHG (ref 35–45)
PH BLDA: 7.13 [PH] (ref 7.35–7.45)
PH BLDA: 7.22 [PH] (ref 7.35–7.45)
PH BLDA: 7.32 [PH] (ref 7.35–7.45)
PH BLDA: 7.36 [PH] (ref 7.35–7.45)
PH BLDA: 7.39 [PH] (ref 7.35–7.45)
PH UR: 7.5 [PH] (ref 5–8)
PLATELET # BLD AUTO: 114 K/UL (ref 150–450)
PLATELET # BLD AUTO: 172 K/UL (ref 150–450)
PLATELET # BLD AUTO: 183 K/UL (ref 150–450)
PO2 BLDA: 106 MMHG (ref 83–108)
PO2 BLDA: 128 MMHG (ref 83–108)
PO2 BLDA: 128 MMHG (ref 83–108)
PO2 BLDA: 134 MMHG (ref 83–108)
PO2 BLDA: 70 MMHG (ref 83–108)
POTASSIUM SERPL-SCNC: 4.4 MMOL/L (ref 3.5–5.1)
POTASSIUM SERPL-SCNC: 4.5 MMOL/L (ref 3.5–5.1)
PROT SERPL-MCNC: 5 G/DL (ref 6.3–8.2)
PROT SERPL-MCNC: 8.2 G/DL (ref 6.3–8.2)
PROT UR QL: (no result)
PT BLD: 10.6 SEC (ref 9–12)
PT BLD: 11.6 SEC (ref 9–12)
RBC UR QL: 1 /HPF (ref 0–5)
SODIUM SERPL-SCNC: 139 MMOL/L (ref 137–145)
SODIUM SERPL-SCNC: 142 MMOL/L (ref 137–145)
SP GR UR: 1.01 (ref 1–1.03)
SQUAMOUS UR QL AUTO: <1 /HPF (ref 0–4)
UROBILINOGEN UR QL STRIP: <2 MG/DL (ref ?–2)
WBC # BLD AUTO: 14.7 K/UL (ref 3.8–10.6)
WBC # BLD AUTO: 16.3 K/UL (ref 3.8–10.6)
WBC # BLD AUTO: 21.1 K/UL (ref 3.8–10.6)
WBC # UR AUTO: 1 /HPF (ref 0–5)

## 2020-11-09 PROCEDURE — 74177 CT ABD & PELVIS W/CONTRAST: CPT

## 2020-11-09 PROCEDURE — 99291 CRITICAL CARE FIRST HOUR: CPT

## 2020-11-09 PROCEDURE — 83605 ASSAY OF LACTIC ACID: CPT

## 2020-11-09 PROCEDURE — 81001 URINALYSIS AUTO W/SCOPE: CPT

## 2020-11-09 PROCEDURE — 82805 BLOOD GASES W/O2 SATURATION: CPT

## 2020-11-09 PROCEDURE — 93306 TTE W/DOPPLER COMPLETE: CPT

## 2020-11-09 PROCEDURE — 37221: CPT

## 2020-11-09 PROCEDURE — 84145 PROCALCITONIN (PCT): CPT

## 2020-11-09 PROCEDURE — 85027 COMPLETE CBC AUTOMATED: CPT

## 2020-11-09 PROCEDURE — 86850 RBC ANTIBODY SCREEN: CPT

## 2020-11-09 PROCEDURE — 04V04DZ RESTRICTION OF ABDOMINAL AORTA WITH INTRALUMINAL DEVICE, PERCUTANEOUS ENDOSCOPIC APPROACH: ICD-10-PCS

## 2020-11-09 PROCEDURE — 82150 ASSAY OF AMYLASE: CPT

## 2020-11-09 PROCEDURE — 34705 EVAC RPR A-BIILIAC NDGFT: CPT

## 2020-11-09 PROCEDURE — 047J3DZ DILATION OF LEFT EXTERNAL ILIAC ARTERY WITH INTRALUMINAL DEVICE, PERCUTANEOUS APPROACH: ICD-10-PCS

## 2020-11-09 PROCEDURE — 86920 COMPATIBILITY TEST SPIN: CPT

## 2020-11-09 PROCEDURE — 80053 COMPREHEN METABOLIC PANEL: CPT

## 2020-11-09 PROCEDURE — 94660 CPAP INITIATION&MGMT: CPT

## 2020-11-09 PROCEDURE — 86901 BLOOD TYPING SEROLOGIC RH(D): CPT

## 2020-11-09 PROCEDURE — 96361 HYDRATE IV INFUSION ADD-ON: CPT

## 2020-11-09 PROCEDURE — 85610 PROTHROMBIN TIME: CPT

## 2020-11-09 PROCEDURE — 96375 TX/PRO/DX INJ NEW DRUG ADDON: CPT

## 2020-11-09 PROCEDURE — 86900 BLOOD TYPING SEROLOGIC ABO: CPT

## 2020-11-09 PROCEDURE — 86891 AUTOLOGOUS BLOOD OP SALVAGE: CPT

## 2020-11-09 PROCEDURE — 71045 X-RAY EXAM CHEST 1 VIEW: CPT

## 2020-11-09 PROCEDURE — 85730 THROMBOPLASTIN TIME PARTIAL: CPT

## 2020-11-09 PROCEDURE — 87086 URINE CULTURE/COLONY COUNT: CPT

## 2020-11-09 PROCEDURE — 83690 ASSAY OF LIPASE: CPT

## 2020-11-09 PROCEDURE — 96374 THER/PROPH/DIAG INJ IV PUSH: CPT

## 2020-11-09 PROCEDURE — 85025 COMPLETE CBC W/AUTO DIFF WBC: CPT

## 2020-11-09 PROCEDURE — 80048 BASIC METABOLIC PNL TOTAL CA: CPT

## 2020-11-09 PROCEDURE — 36415 COLL VENOUS BLD VENIPUNCTURE: CPT

## 2020-11-09 RX ADMIN — CEFAZOLIN ONE: 330 INJECTION, POWDER, FOR SOLUTION INTRAMUSCULAR; INTRAVENOUS at 17:39

## 2020-11-09 RX ADMIN — CEFAZOLIN SCH MLS: 330 INJECTION, POWDER, FOR SOLUTION INTRAMUSCULAR; INTRAVENOUS at 19:36

## 2020-11-09 RX ADMIN — CEFAZOLIN ONE MLS: 330 INJECTION, POWDER, FOR SOLUTION INTRAMUSCULAR; INTRAVENOUS at 11:28

## 2020-11-09 NOTE — P.OP
Date of Procedure: 11/09/20


Preoperative Diagnosis: 


Ruptured infrarenal abdominal aortic aneurysm.


Postoperative Diagnosis: 


#1: Ruptured infrarenal abdominal aortic aneurysm.


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


#3: Severe left external iliac artery stenosis.


#4: Extremely tortuous external iliac artery segments bilaterally.


Procedure(s) Performed: 


#1: Ultrasound-guided cannulation bilateral common femoral arteries.


#2: Open femoral artery exposure on the left. 


#3:Placement of aortobiiliac endograft.


#4: Lesion of the balloon expandable bare metal stent left external iliac artery


Implants: 


Medtronic stent graft aortobiiliac system.


Anesthesia: GETA


Surgeon: Carlos Solitario


Assistant #1: Aiyana Woody


Estimated Blood Loss (ml): 500


IV fluids (ml): 3,000


Urine output (ml): 630


Pathology: none sent


Condition: critical


Disposition: ICU


Indications for Procedure: 


Patient is a 80-year-old male who presented to the emergency room earlier today 

with a chief complaint of severe back pain beginning approximately midnight.  In

the emergency room he did undergo CT angiogram of his abdominal pelvic vessels 

which demonstrates a ruptured abdominal aortic aneurysm.  Patient is thus 

offered repair of ruptured aneurysm.


Description of Procedure: 


Patient was brought to special procedure suite.  He was sterilely prepped and 

draped in usual manner.  Oley catheter is placed to gravity drainage.  Patient 

received 2 g of Ancef intravenously for of lactic perioperative antibiotic 

therapy.  He was administered general endotracheal anesthesia delivered by the 

department anesthesiology.





Utilizing ultrasound a multipurpose needle was utilized to cannulate the right 

common femoral artery.  Once cannulated Softip guidewire is advanced into the 

iliac artery.  The needle was withdrawn and a 6-Chadian sheath was placed.  

Subsequently 2 Perclose devices were placed for ventral closure of the artery 

postoperatively.  Once this was achieved an 8-Chadian sheath was placed in the 

vessel.





A similar procedure was performed on the contralateral side however extreme 

tortuosity prevented placement of Perclose device.  I was unable to advance 

multiple guidewire/catheter combinations.  Attempt at guidewire snare is made by

advancing a catheter and guidewire combinations across the aortic bifurcation 

from the right down into the left common iliac artery.  Able to snare the wire. 

Eventually additional attempts at advancing the guidewire and catheter 

combination were successful with a catheter and guidewire being advanced into 

the infrarenal abdominal aorta.





Once this was achieved the guidewire was removed from the left and a Lunderquist

wire was advanced with the tip placed in the aortic knob.  Similarly a 

Lunderquist wire was advanced from the right side.





The patient was systemically heparinized and ACT was drawn after adequate 

circulation time this is found to be 277 seconds was felt to be adequate.  A 

marker pigtail catheter was advanced over the Lunderquist wire and positioned at

the T12-L1 interspace after the wire was removed.  Planing angiogram was 

performed.  The origins of the renal arteries were marked.  On the contralateral

side the ribs a place sheath was exchanged for an Endurate 2 stent graft system 

main body.  This measured 32 x 14 x 103 cm.  It was advanced to the level of the

renal arteries and deployed.  Repeat angiogram demonstrated the graft system to 

be in good position as below the origin of both renal arteries.  This was 

deployed until the contralateral gate deployed.  The suprarenal shunt device was

deployed.





From the left side a guidewire and angled glide catheter combination were 

utilized to cannulate the left limb gate.  The catheter was exchanged for a 

marker pigtail catheter and a left iliac angiogram was performed.  It measured 

approximately 150 cm from the proximal portion the gait to the origin of the 

left internal iliac artery.  A 16 x 28 x 156 mm Endurate 2 stent graft limb was 

selected.  Through the pigtail catheter Lunderquist wire was readvanced and over

and the pigtail catheter removed.  The limb was deployed.  The remaining right 

limb of the main body was then fully deployed.





Attention was turned to the right limb radial catheter was utilized to measure 

the distance between the gait and the bifurcation of the internal/external iliac

arteries.  A 16 mm x 20 mm x 124 mm limb was selected and deployed.





A Coda balloon was then utilized to balloon dilate the inflow and both limbs of 

the stent graft system.  Completion angiogram was then performed.  This 

demonstrated no evidence of endoleak however a severe left iliac artery stenosis

essentially at the origin of the external iliac artery was identified.  This was

treated with a 12 mm x 40 mm balloon expandable stent.  Completion angiogram 

demonstrated this now to be patent with no significant issues noted.





With the above findings noted on the right the sheath and guidewires were 

withdrawn and the puncture arteriotomy was closed with the use a placed Perclose

devices on the left a cutdown was performed and the enterotomy is closed was 6-0

Prolene suture.





At the completion of the case patient had easily palpable femoral pulses.  Both 

feet appear to be adequately perfused with normal pink color being noted 

bilaterally.





Total contrast volume is 180 mL of 5 contrast.


Total fluoroscopy time 102.3 minutes.

## 2020-11-09 NOTE — CT
EXAMINATION TYPE: CT abdomen pelvis w con

 

DATE OF EXAM: 11/9/2020

 

COMPARISON: None.

 

HISTORY: abdominal pain, acute

 

CT DLP: 1329.7 mGycm, Automated Exposure Control for Dose Reduction was Utilized.

 

CONTRAST: 

CT scan of the abdomen and pelvis is performed without oral and with IV Contrast, patient injected wi
th 100 mL of Isovue 300.

 

FINDINGS:

 

LUNG BASES: Mild to moderate bibasilar linear scarring and/or atelectasis.

 

LIVER/GB: 2 large dependent gallstones. No surrounding inflammatory change.

 

PANCREAS:  No significant abnormality is seen.

 

SPLEEN:  No significant abnormality is seen.

 

ADRENALS: Slight nodular thickening to both adrenal glands raises concern for benign lipid rich hyper
plasia.

 

KIDNEYS: There are scattered nonobstructing renal calculi bilaterally. Roughly 2-4 scattered calculi 
in the left kidney and 6010 scattered calculi in the right kidney. There is symmetric cortical medull
liliam uptake without visualized excretion. No hydronephrosis bilaterally. There are simple appearing th
in-walled cyst throughout both kidneys including largest lobulated cystic lesion mid to lower pole le
edwina left kidney. Mildly distended bladder.

 

BOWEL: Sigmoid colonic diverticulosis.

 

PROSTATE/SEMINAL VESICLES: Enlarged prostate gland consistent with BPH. 

 

LYMPH NODES:  No greater than 1cm abdominal or pelvic lymph nodes are appreciated.

 

OSSEOUS STRUCTURES: Metallic hardware from left hip arthroplasty causes streak artifact limiting eval
uation of pelvic structures. Moderate axial joint space loss and spurring in the right hip. Moderate 
to severe multilevel spurring in the spine. Moderate to severe disc space narrowing lumbosacral junct
ion.

 

OTHER: There is AAA measuring roughly 9 cm in length: Image 49 not extending into the common iliac ar
teries bilaterally. Aneurysm measures up to 7.7 cm AP diameter by 7.3 cm transversely. Evaluation is 
suboptimal as noncontrast imaging not performed. There is significant noncalcified plaque occupying m
ajority of the anterior lumen of aneurysm with areas of irregular hyperdensity in the opacified porti
on, there is right irregular hyperdense component coronal image 51. There is more linear vague anteri
or hyperdense component. In addition there is moderate to severe ill-defined retroperitoneal fluid wi
th local mass effect extending into the right midabdomen. Transverse diameter of fluid collection sari
roaching 25 cm. Patent celiac axis, SMA, and bilateral single renal arteries with moderate to severe 
plaque is noted superior to AAA. Probable significant stenosis of celiac artery origin sagittal image
 76 greater than 50%.

 

IMPRESSION: Large unstable AAA with retroperitoneal hemorrhage suggesting leak and suspected impendin
g rupture.

Urgent vascular and/or surgical referral advised.

 

Critical results communicated to ordering emergency care physician assistant via telephone at time of
 dictation.

## 2020-11-09 NOTE — ED
Abdominal Pain HPI





<Bakari Ash - Last Filed: 11/09/20 10:38>





- General


Source: patient


Mode of arrival: wheelchair


Limitations: no limitations





<Priscilla Caraballo - Last Filed: 11/09/20 13:15>





- General


Chief Complaint: Abdominal Pain


Stated Complaint: abd pain


Time Seen by Provider: 11/09/20 08:52





- History of Present Illness


Initial Comments: 





Patient is a 80-year-old male, history of A. fib, COPD, presenting to emergency 

Department with complaints of right-sided abdominal pain that started about 

midnight.  He's also had some vomiting.  He states the pain is very sharp and 

severe, mostly on his right side of his abdomen and lower with some radiation to

the right side.  He states he cannot get comfortable.  He did have regular bowel

movement yesterday.  He does have a history of appendectomy, gastric surgery 

secondary to bleeding ulcer.  He's had no fever, no chills.  He denies any chest

pain, admits to some very mild shortness of breath but feels like it's because 

he is in so much pain.  He is no urinary complaints.  He has no further 

complaints.  Upon arrival to the ER, his blood pressures elevated at 186/100, 

rest of vitals normal. (Priscilla Caraballo)





- Related Data


                                Home Medications











 Medication  Instructions  Recorded  Confirmed


 


Tamsulosin HCl [Flomax] 0.4 mg PO DAILY 05/31/18 11/09/20


 


allopurinoL [Zyloprim] 300 mg PO DAILY 05/31/18 11/09/20


 


atenoloL [Atenolol] 25 mg PO BID 05/31/18 11/09/20








                                  Previous Rx's











 Medication  Instructions  Recorded


 


Aspirin 325 mg PO DAILY #90 tab 04/24/19











                                    Allergies











Allergy/AdvReac Type Severity Reaction Status Date / Time


 


No Known Allergies Allergy   Verified 11/09/20 10:45














Review of Systems


ROS Other: All systems not noted in ROS Statement are negative.





<Bakari Ash - Last Filed: 11/09/20 10:38>


ROS Other: All systems not noted in ROS Statement are negative.





<Priscilla Caraballo - Last Filed: 11/09/20 13:15>


ROS Statement: 


Those systems with pertinent positive or pertinent negative responses have been 

documented in the HPI.








Past Medical History


Past Medical History: Atrial Fibrillation, Cancer, COPD, Hyperlipidemia, 

Hypertension, Osteoarthritis (OA), Prostate Disorder, Renal Disease, Skin 

Disorder, Sleep Apnea/CPAP/BIPAP


Additional Past Medical History / Comment(s): 1960s had bleeding stomach ulcer 

with surgery, acute renal failure 4/2018, elevated PSA, gout bilateral ankles 

and toes both feet, chronic back pain, psoriasis, skin cancer with removals. No 

CPAP use.


History of Any Multi-Drug Resistant Organisms: None Reported


Past Surgical History: Appendectomy, Back Surgery, Joint Replacement


Additional Past Surgical History / Comment(s): Total left hip arthroplasty, 

gastric surgery for bleeding ulcer, 4 back surgeries, skin cancer removals.


Past Anesthesia/Blood Transfusion Reactions: No Reported Reaction


Additional Past Anesthesia/Blood Transfusion Reaction / Comment(s): "Prefers not

 to have Ativan."


Past Psychological History: No Psychological Hx Reported


Smoking Status: Current every day smoker


Past Alcohol Use History: None Reported


Past Drug Use History: None Reported





- Past Family History


  ** Mother


Family Medical History: Cancer, Skin Disorder


Additional Family Medical History / Comment(s): Psoriasis, unknown to pt type of

 cancer.





  ** Father


Family Medical History: No Reported History


Additional Family Medical History / Comment(s): Father was healthy.





  ** Brother(s)


Family Medical History: Cancer


Additional Family Medical History / Comment(s): COLON CANCER.





<Priscilla Caraballo L - Last Filed: 11/09/20 13:15>





General Exam


Limitations: no limitations





<Priscilla Caraballo - Last Filed: 11/09/20 13:15>





- General Exam Comments


Initial Comments: 





GENERAL: 


Patient is well-developed and well-nourished.  Patient is nontoxic and in mild 

distress.





HEAD: 


Atraumatic, normocephalic.





EYES:


Pupils equal round and reactive to light, extraocular movements intact, sclera 

anicteric, conjunctiva are normal.  Eyelids were unremarkable.





ENT: 


TMs normal, nares patent, oropharynx clear without exudates.  Moist mucous 

membranes.





NECK: 


Normal range of motion, supple without lymphadenopathy or JVD.





LUNGS:


Unlabored respirations.  Breath sounds clear to auscultation bilaterally and 

equal.  No wheezes rales or rhonchi.





HEART:


Regular rate and rhythm without murmurs, rubs or gallops.





ABDOMEN: 


Tender to palpation of the entire right side of the abdomen, right flank, 

positive guarding, seems mildly distended.  Soft, normoactive bowel sounds.  No 

rebound.  No masses appreciated.





: Deferred 





MUSCULOSKELETAL: 


Normal extremities with adequate strength and normal range of motion, no pitting

 or edema.  No clubbing or cyanosis.  Neurovascular intact.





NEUROLOGICAL: 


Patient is alert and oriented x 3.  Motor and sensory are also intact.  Cranial 

nerves II through XII grossly intact.  Symmetrical smile.  Normal speech, normal

 gait.   





PSYCH:


Normal mood, normal affect.





SKIN:


 Warm, Dry, normal turgor, no rashes or lesions noted. (Priscilla Caraballo)





Course





<Bakari Ash - Last Filed: 11/09/20 10:38>





<Priscilla Caraballo - Last Filed: 11/09/20 13:15>


                                   Vital Signs











  11/09/20 11/09/20





  08:45 11:34


 


Temperature 97.3 F L 


 


Pulse Rate 75 108 H


 


Respiratory 18 18





Rate  


 


Blood Pressure 186/100 126/96


 


O2 Sat by Pulse 95 96





Oximetry  














- Reevaluation(s)


Reevaluation #1: 





11/09/20 10:39


Patient reevaluated and reexamined by myself, Dr. Ash.  Patient does appear 

uncomfortable at this time with minimal abdominal distention.  CT report 

reviewed as well as images.  Patient updated on results and patient is 

interested in having surgery.  Case was discussed with Dr. Solitario, who will 

evaluate films and call back.  I do agree with PA findings.  This includes 

diagnostic interpretation and treatment plan. (Bakari Ash)


Reevaluation #2: 





11/09/20 11:20


Cath lab called to get consent, they are taking him to the OR now. 

(Priscilla Caraballo)





Medical Decision Making





- Lab Data


Result diagrams: 


                                 11/09/20 09:12





                                 11/09/20 09:11





<Bakari Ash - Last Filed: 11/09/20 10:38>





- Lab Data


Result diagrams: 


                                 11/09/20 09:12





                                 11/09/20 09:11





<Priscilla Caraballo - Last Filed: 11/09/20 13:15>





- Medical Decision Making





Patient is 80-year-old male here for lower right-sided abdominal pain that 

started about midnight.  The pressure was elevated upon arrival, rest was 

normal.  He did seem quite tender in the right side of the abdomen, mildly 

distended.  Labs reveal white count 16.3, lactic is 2.1, coags are normal urine 

shows no evidence of infection.  CT abdomen and pelvis reveals a large unstable 

AAA with retroperitoneal hemorrhage, suggested leak, and suspected impending 

rupture.  Patient was given pain control, vital signs are stable at this time.  

Case discussed with Dr. Ash who contacted Dr. Solitario.  Dr. Solitario will 

take patient to cath lab.  Patient is in agreement with this plan of care.   

(Priscilla Caraballo)





- Lab Data


                                   Lab Results











  11/09/20 11/09/20 11/09/20 Range/Units





  09:11 09:11 09:11 


 


WBC     (3.8-10.6)  k/uL


 


RBC     (4.30-5.90)  m/uL


 


Hgb     (13.0-17.5)  gm/dL


 


Hct     (39.0-53.0)  %


 


MCV     (80.0-100.0)  fL


 


MCH     (25.0-35.0)  pg


 


MCHC     (31.0-37.0)  g/dL


 


RDW     (11.5-15.5)  %


 


Plt Count     (150-450)  k/uL


 


Neutrophils %     %


 


Lymphocytes %     %


 


Monocytes %     %


 


Eosinophils %     %


 


Basophils %     %


 


Neutrophils #     (1.3-7.7)  k/uL


 


Lymphocytes #     (1.0-4.8)  k/uL


 


Monocytes #     (0-1.0)  k/uL


 


Eosinophils #     (0-0.7)  k/uL


 


Basophils #     (0-0.2)  k/uL


 


Hypochromasia     


 


PT  10.6    (9.0-12.0)  sec


 


INR  1.0    (<1.2)  


 


APTT  25.6    (22.0-30.0)  sec


 


Sodium   139   (137-145)  mmol/L


 


Potassium   4.4   (3.5-5.1)  mmol/L


 


Chloride   103   ()  mmol/L


 


Carbon Dioxide   26   (22-30)  mmol/L


 


Anion Gap   10   mmol/L


 


BUN   19   (9-20)  mg/dL


 


Creatinine   1.00   (0.66-1.25)  mg/dL


 


Est GFR (CKD-EPI)AfAm   82   (>60 ml/min/1.73 sqM)  


 


Est GFR (CKD-EPI)NonAf   71   (>60 ml/min/1.73 sqM)  


 


Glucose   195 H   (74-99)  mg/dL


 


Lactic Ac Sepsis Rflx     


 


Plasma Lactic Acid Francisco    2.1 H*  (0.7-2.0)  mmol/L


 


Calcium   9.6   (8.4-10.2)  mg/dL


 


Total Bilirubin   1.0   (0.2-1.3)  mg/dL


 


AST   19   (17-59)  U/L


 


ALT   13   (4-49)  U/L


 


Alkaline Phosphatase   124   ()  U/L


 


Total Protein   8.2   (6.3-8.2)  g/dL


 


Albumin   4.8   (3.5-5.0)  g/dL


 


Amylase   64   ()  U/L


 


Lipase   147   ()  U/L


 


Urine Color     


 


Urine Appearance     (Clear)  


 


Urine pH     (5.0-8.0)  


 


Ur Specific Gravity     (1.001-1.035)  


 


Urine Protein     (Negative)  


 


Urine Glucose (UA)     (Negative)  


 


Urine Ketones     (Negative)  


 


Urine Blood     (Negative)  


 


Urine Nitrite     (Negative)  


 


Urine Bilirubin     (Negative)  


 


Urine Urobilinogen     (<2.0)  mg/dL


 


Ur Leukocyte Esterase     (Negative)  


 


Urine RBC     (0-5)  /hpf


 


Urine WBC     (0-5)  /hpf


 


Ur Squamous Epith Cells     (0-4)  /hpf


 


Hyaline Casts     (0-2)  /lpf


 


Urine Mucus     (None)  /hpf














  11/09/20 11/09/20 11/09/20 Range/Units





  09:12 09:12 10:08 


 


WBC  16.3 H    (3.8-10.6)  k/uL


 


RBC  5.66    (4.30-5.90)  m/uL


 


Hgb  15.7    (13.0-17.5)  gm/dL


 


Hct  50.2    (39.0-53.0)  %


 


MCV  88.7    (80.0-100.0)  fL


 


MCH  27.8    (25.0-35.0)  pg


 


MCHC  31.3    (31.0-37.0)  g/dL


 


RDW  14.6    (11.5-15.5)  %


 


Plt Count  183    (150-450)  k/uL


 


Neutrophils %  90    %


 


Lymphocytes %  5    %


 


Monocytes %  4    %


 


Eosinophils %  1    %


 


Basophils %  0    %


 


Neutrophils #  14.7 H    (1.3-7.7)  k/uL


 


Lymphocytes #  0.8 L    (1.0-4.8)  k/uL


 


Monocytes #  0.6    (0-1.0)  k/uL


 


Eosinophils #  0.1    (0-0.7)  k/uL


 


Basophils #  0.0    (0-0.2)  k/uL


 


Hypochromasia  Slight    


 


PT     (9.0-12.0)  sec


 


INR     (<1.2)  


 


APTT     (22.0-30.0)  sec


 


Sodium     (137-145)  mmol/L


 


Potassium     (3.5-5.1)  mmol/L


 


Chloride     ()  mmol/L


 


Carbon Dioxide     (22-30)  mmol/L


 


Anion Gap     mmol/L


 


BUN     (9-20)  mg/dL


 


Creatinine     (0.66-1.25)  mg/dL


 


Est GFR (CKD-EPI)AfAm     (>60 ml/min/1.73 sqM)  


 


Est GFR (CKD-EPI)NonAf     (>60 ml/min/1.73 sqM)  


 


Glucose     (74-99)  mg/dL


 


Lactic Ac Sepsis Rflx    Y  


 


Plasma Lactic Acid Francisco     (0.7-2.0)  mmol/L


 


Calcium     (8.4-10.2)  mg/dL


 


Total Bilirubin     (0.2-1.3)  mg/dL


 


AST     (17-59)  U/L


 


ALT     (4-49)  U/L


 


Alkaline Phosphatase     ()  U/L


 


Total Protein     (6.3-8.2)  g/dL


 


Albumin     (3.5-5.0)  g/dL


 


Amylase     ()  U/L


 


Lipase     ()  U/L


 


Urine Color   Light Yellow   


 


Urine Appearance   Clear   (Clear)  


 


Urine pH   7.5   (5.0-8.0)  


 


Ur Specific Gravity   1.011   (1.001-1.035)  


 


Urine Protein   2+ H   (Negative)  


 


Urine Glucose (UA)   2+ H   (Negative)  


 


Urine Ketones   Negative   (Negative)  


 


Urine Blood   Trace H   (Negative)  


 


Urine Nitrite   Negative   (Negative)  


 


Urine Bilirubin   Negative   (Negative)  


 


Urine Urobilinogen   <2.0   (<2.0)  mg/dL


 


Ur Leukocyte Esterase   Negative   (Negative)  


 


Urine RBC   1   (0-5)  /hpf


 


Urine WBC   1   (0-5)  /hpf


 


Ur Squamous Epith Cells   <1   (0-4)  /hpf


 


Hyaline Casts   1   (0-2)  /lpf


 


Urine Mucus   Rare H   (None)  /hpf














Critical Care Time


Critical Care Time: Yes


Total Critical Care Time: 40 (Leaking AAA, taken to cath lab)





<Priscilla Caraballo - Last Filed: 11/09/20 13:15>





Disposition





<Bakari Ash - Last Filed: 11/09/20 10:38>


Decision Date: 11/09/20


Decision Time: 11:19





<Priscilla Caraballo - Last Filed: 11/09/20 13:15>


Clinical Impression: 


 Abdominal pain, Leaking abdominal aortic aneurysm (AAA)





Disposition: ADMITTED AS IP TO THIS [facility]


Condition: Serious

## 2020-11-10 LAB
ANION GAP SERPL CALC-SCNC: 3 MMOL/L
BASOPHILS # BLD AUTO: 0 K/UL (ref 0–0.2)
BASOPHILS NFR BLD AUTO: 0 %
BUN SERPL-SCNC: 20 MG/DL (ref 9–20)
CALCIUM SPEC-MCNC: 7.3 MG/DL (ref 8.4–10.2)
CHLORIDE SERPL-SCNC: 111 MMOL/L (ref 98–107)
CO2 SERPL-SCNC: 26 MMOL/L (ref 22–30)
EOSINOPHIL # BLD AUTO: 0 K/UL (ref 0–0.7)
EOSINOPHIL NFR BLD AUTO: 0 %
ERYTHROCYTE [DISTWIDTH] IN BLOOD BY AUTOMATED COUNT: 3.66 M/UL (ref 4.3–5.9)
ERYTHROCYTE [DISTWIDTH] IN BLOOD: 16.3 % (ref 11.5–15.5)
GLUCOSE SERPL-MCNC: 152 MG/DL (ref 74–99)
HCT VFR BLD AUTO: 32.5 % (ref 39–53)
HGB BLD-MCNC: 10.6 GM/DL (ref 13–17.5)
LYMPHOCYTES # SPEC AUTO: 0.7 K/UL (ref 1–4.8)
LYMPHOCYTES NFR SPEC AUTO: 5 %
MCH RBC QN AUTO: 28.8 PG (ref 25–35)
MCHC RBC AUTO-ENTMCNC: 32.5 G/DL (ref 31–37)
MCV RBC AUTO: 88.8 FL (ref 80–100)
MONOCYTES # BLD AUTO: 1.1 K/UL (ref 0–1)
MONOCYTES NFR BLD AUTO: 7 %
NEUTROPHILS # BLD AUTO: 13.3 K/UL (ref 1.3–7.7)
NEUTROPHILS NFR BLD AUTO: 87 %
PH UR: 5.5 [PH] (ref 5–8)
PLATELET # BLD AUTO: 112 K/UL (ref 150–450)
POTASSIUM SERPL-SCNC: 5.1 MMOL/L (ref 3.5–5.1)
PROT UR QL: (no result)
RBC UR QL: 53 /HPF (ref 0–5)
SODIUM SERPL-SCNC: 140 MMOL/L (ref 137–145)
SP GR UR: >1.05 (ref 1–1.03)
SQUAMOUS UR QL AUTO: <1 /HPF (ref 0–4)
UROBILINOGEN UR QL STRIP: <2 MG/DL (ref ?–2)
WBC # BLD AUTO: 15.2 K/UL (ref 3.8–10.6)
WBC # UR AUTO: 17 /HPF (ref 0–5)

## 2020-11-10 RX ADMIN — CEFAZOLIN SCH MLS/HR: 330 INJECTION, POWDER, FOR SOLUTION INTRAMUSCULAR; INTRAVENOUS at 21:06

## 2020-11-10 RX ADMIN — TAMSULOSIN HYDROCHLORIDE SCH MG: 0.4 CAPSULE ORAL at 11:24

## 2020-11-10 RX ADMIN — ATORVASTATIN CALCIUM SCH MG: 80 TABLET, FILM COATED ORAL at 21:06

## 2020-11-10 RX ADMIN — CLEVIPIDINE SCH: 0.5 EMULSION INTRAVENOUS at 20:02

## 2020-11-10 RX ADMIN — CLEVIPIDINE SCH: 0.5 EMULSION INTRAVENOUS at 00:08

## 2020-11-10 RX ADMIN — CEFAZOLIN SCH MLS/HR: 330 INJECTION, POWDER, FOR SOLUTION INTRAMUSCULAR; INTRAVENOUS at 23:48

## 2020-11-10 RX ADMIN — CEFAZOLIN SCH MLS/HR: 330 INJECTION, POWDER, FOR SOLUTION INTRAMUSCULAR; INTRAVENOUS at 00:33

## 2020-11-10 NOTE — XR
EXAMINATION TYPE: XR chest 1V portable

 

DATE OF EXAM: 11/10/2020

 

HISTORY: Shortness of breath.

 

COMPARISON: 5/31/2018

 

TECHNIQUE: Single view of the chest is submitted.

 

FINDINGS:

Demonstrated are scattered senescent parenchymal change.  

 

Increased density right medial lung base may reflect developing infiltrate or atelectasis. Correlate 
clinically and progress studies are recommended. There is also mild increased density at the left med
ial lung base.

 

The heart is stable.

 

Hilar and mediastinal structures are within normal limits.  

 

Degenerative changes are seen of the dorsal spine. 

 

 IMPRESSION: 

 

1.  Increased density right medial lung base may reflect developing infiltrate or atelectasis. Correl
ate clinically and progress studies are recommended. There is also mild increased density at the left
 medial lung base.

## 2020-11-10 NOTE — IR
EXAMINATION TYPE: IR pta aorta

 

DATE OF EXAM: 11/9/2020

 

COMPARISON: NONE

 

HISTORY: Fluoroscopy time.

 

Fluoroscopy was provided to the referring clinician.

## 2020-11-10 NOTE — P.PN
Subjective


Progress Note Date: 11/10/20


Principal diagnosis: 





Ruptured infrarenal abdominal aortic aneurysm





The patient is a 80-year-old  male who presented to the emergency 

department yesterday with complaints of severe back pain and abdominal pain that

started in the middle of the night.  In the emergency department he underwent a 

CT angiogram of the abdomen pelvis which demonstrated a ruptured abdominal a

ortic aneurysm.  He is status postop day #1 for a infrarenal abdominal aortic 

aneurysm repair with aortobiliac endograft.  The patient has a past medical 

history of chronic atrial fibrillation, COPD, hypertension, current smoker 

hyperlipidemia, chronic kidney disease, obstructive sleep apnea, osteoarthritis 

and history of GI bleed related to peptic ulcers.  The patient currently denies 

any chest pain or shortness of breath.  He denies any active bleeding from groin

sites.  Has some mild tenderness.  Is tolerating a regular diet.  Woody catheter

still in place.





Objective





- Vital Signs


Vital signs: 


                                   Vital Signs











Temp  98.3 F   11/10/20 04:00


 


Pulse  111 H  11/10/20 08:00


 


Resp  18   11/10/20 08:00


 


BP  115/77   11/10/20 08:00


 


Pulse Ox  91 L  11/10/20 08:00








                                 Intake & Output











 11/09/20 11/10/20 11/10/20





 18:59 06:59 18:59


 


Intake Total 1930 1925 125


 


Output Total  483 35


 


Balance 1930 1442 90


 


Weight 88.451 kg 97.6 kg 


 


Intake:   


 


  IV 1000 1925 125


 


    Sodium Chloride 0.9% 1,  1375 125





    000 ml @ 125 mls/hr IV .   





    Q8H ROBI Rx#:[id number]   


 


    ceFAZolin 1,000 mg In  50 





    Sodium Chloride 0.9% 50   





    ml @ 100 mls/hr IVPB Q8HR   





    ROBI Rx#:[id number]   


 


  Blood Product 930  


 


    Rc As-1  Unit 310  





    [id number]   


 


    Rc As-1  Unit 310  





    [id number]   


 


    Rc As-1  Unit 310  





    [id number]   


 


Output:   


 


  Urine  483 35


 


Other:   


 


  Voiding Method Toilet Indwelling Catheter Indwelling Catheter








                       ABP, PAP, CO, CI - Last Documented











Arterial Blood Pressure        122/51

















- Exam





General appearance: The patient is alert, oriented, in no acute distress.


HET: Head is normocephalic and atraumatic.  


Neck: Supple without lymphadenopathy.  Trachea midline.


Heart: S1 S2.  Regular rate and rhythm.


Lungs: No crackles or wheezes are heard.


Abdomen: Soft, nontender, nondistended with  bowel sounds.  No peritoneal signs.

 No palpable organomegaly or masses.


Groins:  Bilateral groins with mild ecchymosis.  No active bleeding.


Extremities: Normal skin color and turgor.  No cyanosis, rash, ulceration, 

clubbing, or edema.  +2 palpable radial pulses bilaterally.  Bilateral dorsalis 

pedis Doppler signals.  Full range of motion of bilateral lower extremities.


Neurological: No focal deficits.  Strength and sensation are grossly intact.





- Labs


CBC & Chem 7: 


                                 11/10/20 03:45





                                 11/10/20 03:45


Labs: 


                  Abnormal Lab Results - Last 24 Hours (Table)











  11/09/20 11/09/20 11/09/20 Range/Units





  09:11 09:11 09:12 


 


WBC    16.3 H  (3.8-10.6)  k/uL


 


RBC     (4.30-5.90)  m/uL


 


Hgb     (13.0-17.5)  gm/dL


 


Hct     (39.0-53.0)  %


 


RDW     (11.5-15.5)  %


 


Plt Count     (150-450)  k/uL


 


Neutrophils #    14.7 H  (1.3-7.7)  k/uL


 


Lymphocytes #    0.8 L  (1.0-4.8)  k/uL


 


Monocytes #     (0-1.0)  k/uL


 


ABG pH     (7.35-7.45)  


 


ABG pCO2     (35-45)  mmHg


 


ABG pO2     ()  mmHg


 


ABG HCO3     (21-25)  mmol/L


 


ABG Total CO2     (19-24)  mmol/L


 


ABG O2 Saturation     (94-97)  %


 


Chloride     ()  mmol/L


 


Glucose  195 H    (74-99)  mg/dL


 


POC Glucose (mg/dL)     (75-99)  mg/dL


 


Plasma Lactic Acid Francisco   2.1 H*   (0.7-2.0)  mmol/L


 


Calcium     (8.4-10.2)  mg/dL


 


Total Protein     (6.3-8.2)  g/dL


 


Albumin     (3.5-5.0)  g/dL


 


Ur Specific Gravity     (1.001-1.035)  


 


Urine Protein     (Negative)  


 


Urine Glucose (UA)     (Negative)  


 


Urine Blood     (Negative)  


 


Urine RBC     (0-5)  /hpf


 


Urine WBC     (0-5)  /hpf


 


Urine Mucus     (None)  /hpf


 


Crossmatch     














  11/09/20 11/09/20 11/09/20 Range/Units





  09:12 11:56 12:55 


 


WBC     (3.8-10.6)  k/uL


 


RBC     (4.30-5.90)  m/uL


 


Hgb     (13.0-17.5)  gm/dL


 


Hct     (39.0-53.0)  %


 


RDW     (11.5-15.5)  %


 


Plt Count     (150-450)  k/uL


 


Neutrophils #     (1.3-7.7)  k/uL


 


Lymphocytes #     (1.0-4.8)  k/uL


 


Monocytes #     (0-1.0)  k/uL


 


ABG pH    7.32 L  (7.35-7.45)  


 


ABG pCO2     (35-45)  mmHg


 


ABG pO2    128 H  ()  mmHg


 


ABG HCO3     (21-25)  mmol/L


 


ABG Total CO2     (19-24)  mmol/L


 


ABG O2 Saturation    99.2 H  (94-97)  %


 


Chloride     ()  mmol/L


 


Glucose     (74-99)  mg/dL


 


POC Glucose (mg/dL)     (75-99)  mg/dL


 


Plasma Lactic Acid Francisco     (0.7-2.0)  mmol/L


 


Calcium     (8.4-10.2)  mg/dL


 


Total Protein     (6.3-8.2)  g/dL


 


Albumin     (3.5-5.0)  g/dL


 


Ur Specific Gravity     (1.001-1.035)  


 


Urine Protein  2+ H    (Negative)  


 


Urine Glucose (UA)  2+ H    (Negative)  


 


Urine Blood  Trace H    (Negative)  


 


Urine RBC     (0-5)  /hpf


 


Urine WBC     (0-5)  /hpf


 


Urine Mucus  Rare H    (None)  /hpf


 


Crossmatch   See Detail   














  11/09/20 11/09/20 11/09/20 Range/Units





  14:10 14:15 16:19 


 


WBC  21.1 H    (3.8-10.6)  k/uL


 


RBC  3.59 L    (4.30-5.90)  m/uL


 


Hgb  10.2 L D    (13.0-17.5)  gm/dL


 


Hct  31.5 L    (39.0-53.0)  %


 


RDW     (11.5-15.5)  %


 


Plt Count     (150-450)  k/uL


 


Neutrophils #     (1.3-7.7)  k/uL


 


Lymphocytes #     (1.0-4.8)  k/uL


 


Monocytes #     (0-1.0)  k/uL


 


ABG pH     (7.35-7.45)  


 


ABG pCO2     (35-45)  mmHg


 


ABG pO2   134 H  128 H  ()  mmHg


 


ABG HCO3     (21-25)  mmol/L


 


ABG Total CO2    25 H  (19-24)  mmol/L


 


ABG O2 Saturation   99.6 H  99.7 H  (94-97)  %


 


Chloride     ()  mmol/L


 


Glucose     (74-99)  mg/dL


 


POC Glucose (mg/dL)     (75-99)  mg/dL


 


Plasma Lactic Acid Francisco     (0.7-2.0)  mmol/L


 


Calcium     (8.4-10.2)  mg/dL


 


Total Protein     (6.3-8.2)  g/dL


 


Albumin     (3.5-5.0)  g/dL


 


Ur Specific Gravity     (1.001-1.035)  


 


Urine Protein     (Negative)  


 


Urine Glucose (UA)     (Negative)  


 


Urine Blood     (Negative)  


 


Urine RBC     (0-5)  /hpf


 


Urine WBC     (0-5)  /hpf


 


Urine Mucus     (None)  /hpf


 


Crossmatch     














  11/09/20 11/09/20 11/09/20 Range/Units





  18:07 19:17 19:42 


 


WBC     (3.8-10.6)  k/uL


 


RBC     (4.30-5.90)  m/uL


 


Hgb     (13.0-17.5)  gm/dL


 


Hct     (39.0-53.0)  %


 


RDW     (11.5-15.5)  %


 


Plt Count     (150-450)  k/uL


 


Neutrophils #     (1.3-7.7)  k/uL


 


Lymphocytes #     (1.0-4.8)  k/uL


 


Monocytes #     (0-1.0)  k/uL


 


ABG pH     (7.35-7.45)  


 


ABG pCO2     (35-45)  mmHg


 


ABG pO2     ()  mmHg


 


ABG HCO3     (21-25)  mmol/L


 


ABG Total CO2     (19-24)  mmol/L


 


ABG O2 Saturation     (94-97)  %


 


Chloride     ()  mmol/L


 


Glucose     (74-99)  mg/dL


 


POC Glucose (mg/dL)  116 H  133 H  143 H  (75-99)  mg/dL


 


Plasma Lactic Acid Francisco     (0.7-2.0)  mmol/L


 


Calcium     (8.4-10.2)  mg/dL


 


Total Protein     (6.3-8.2)  g/dL


 


Albumin     (3.5-5.0)  g/dL


 


Ur Specific Gravity     (1.001-1.035)  


 


Urine Protein     (Negative)  


 


Urine Glucose (UA)     (Negative)  


 


Urine Blood     (Negative)  


 


Urine RBC     (0-5)  /hpf


 


Urine WBC     (0-5)  /hpf


 


Urine Mucus     (None)  /hpf


 


Crossmatch     














  11/09/20 11/09/20 11/09/20 Range/Units





  19:58 21:47 Unknown 


 


WBC    14.7 H  (3.8-10.6)  k/uL


 


RBC    4.08 L  (4.30-5.90)  m/uL


 


Hgb    11.7 L  (13.0-17.5)  gm/dL


 


Hct    35.8 L  (39.0-53.0)  %


 


RDW    15.9 H  (11.5-15.5)  %


 


Plt Count    114 L  (150-450)  k/uL


 


Neutrophils #    12.5 H  (1.3-7.7)  k/uL


 


Lymphocytes #     (1.0-4.8)  k/uL


 


Monocytes #    1.1 H  (0-1.0)  k/uL


 


ABG pH  7.13 L*  7.22 L   (7.35-7.45)  


 


ABG pCO2  82 H*  63 H   (35-45)  mmHg


 


ABG pO2  70 L    ()  mmHg


 


ABG HCO3  27 H  26 H   (21-25)  mmol/L


 


ABG Total CO2  30 H  28 H   (19-24)  mmol/L


 


ABG O2 Saturation  89.8 L  98.1 H   (94-97)  %


 


Chloride     ()  mmol/L


 


Glucose     (74-99)  mg/dL


 


POC Glucose (mg/dL)     (75-99)  mg/dL


 


Plasma Lactic Acid Francisco     (0.7-2.0)  mmol/L


 


Calcium     (8.4-10.2)  mg/dL


 


Total Protein     (6.3-8.2)  g/dL


 


Albumin     (3.5-5.0)  g/dL


 


Ur Specific Gravity     (1.001-1.035)  


 


Urine Protein     (Negative)  


 


Urine Glucose (UA)     (Negative)  


 


Urine Blood     (Negative)  


 


Urine RBC     (0-5)  /hpf


 


Urine WBC     (0-5)  /hpf


 


Urine Mucus     (None)  /hpf


 


Crossmatch     














  11/09/20 11/10/20 11/10/20 Range/Units





  Unknown 03:45 03:45 


 


WBC    15.2 H  (3.8-10.6)  k/uL


 


RBC    3.66 L  (4.30-5.90)  m/uL


 


Hgb    10.6 L  (13.0-17.5)  gm/dL


 


Hct    32.5 L  (39.0-53.0)  %


 


RDW    16.3 H  (11.5-15.5)  %


 


Plt Count    112 L  (150-450)  k/uL


 


Neutrophils #    13.3 H  (1.3-7.7)  k/uL


 


Lymphocytes #    0.7 L  (1.0-4.8)  k/uL


 


Monocytes #    1.1 H  (0-1.0)  k/uL


 


ABG pH     (7.35-7.45)  


 


ABG pCO2     (35-45)  mmHg


 


ABG pO2     ()  mmHg


 


ABG HCO3     (21-25)  mmol/L


 


ABG Total CO2     (19-24)  mmol/L


 


ABG O2 Saturation     (94-97)  %


 


Chloride  110 H  111 H   ()  mmol/L


 


Glucose  139 H  152 H   (74-99)  mg/dL


 


POC Glucose (mg/dL)     (75-99)  mg/dL


 


Plasma Lactic Acid Francisco     (0.7-2.0)  mmol/L


 


Calcium  7.2 L  7.3 L   (8.4-10.2)  mg/dL


 


Total Protein  5.0 L    (6.3-8.2)  g/dL


 


Albumin  2.8 L    (3.5-5.0)  g/dL


 


Ur Specific Gravity     (1.001-1.035)  


 


Urine Protein     (Negative)  


 


Urine Glucose (UA)     (Negative)  


 


Urine Blood     (Negative)  


 


Urine RBC     (0-5)  /hpf


 


Urine WBC     (0-5)  /hpf


 


Urine Mucus     (None)  /hpf


 


Crossmatch     














  11/10/20 Range/Units





  03:45 


 


WBC   (3.8-10.6)  k/uL


 


RBC   (4.30-5.90)  m/uL


 


Hgb   (13.0-17.5)  gm/dL


 


Hct   (39.0-53.0)  %


 


RDW   (11.5-15.5)  %


 


Plt Count   (150-450)  k/uL


 


Neutrophils #   (1.3-7.7)  k/uL


 


Lymphocytes #   (1.0-4.8)  k/uL


 


Monocytes #   (0-1.0)  k/uL


 


ABG pH   (7.35-7.45)  


 


ABG pCO2   (35-45)  mmHg


 


ABG pO2   ()  mmHg


 


ABG HCO3   (21-25)  mmol/L


 


ABG Total CO2   (19-24)  mmol/L


 


ABG O2 Saturation   (94-97)  %


 


Chloride   ()  mmol/L


 


Glucose   (74-99)  mg/dL


 


POC Glucose (mg/dL)   (75-99)  mg/dL


 


Plasma Lactic Acid Francisco   (0.7-2.0)  mmol/L


 


Calcium   (8.4-10.2)  mg/dL


 


Total Protein   (6.3-8.2)  g/dL


 


Albumin   (3.5-5.0)  g/dL


 


Ur Specific Gravity  >1.050 H  (1.001-1.035)  


 


Urine Protein  1+ H  (Negative)  


 


Urine Glucose (UA)   (Negative)  


 


Urine Blood  Moderate H  (Negative)  


 


Urine RBC  53 H  (0-5)  /hpf


 


Urine WBC  17 H  (0-5)  /hpf


 


Urine Mucus  Occasional H  (None)  /hpf


 


Crossmatch   














Assessment and Plan


Assessment: 





1.  Ruptured infrarenal abdominal aortic aneurysm


2.  Postop day #1 for infrarenal aortic aneurysm repair with biiliac endograft


3.  Chronic atrial fibrillation


4.  Hypertension


5.  Hyperlipidemia


6.  Obstructive sleep apnea


7.  Current smoker, extensive history of smoking


8.  COPD


Plan: 





Increase activity as tolerated.  Discontinue Woody catheter.  Consult to 

cardiology for patient's history of atrial fibrillation.  Repeat CBC and BMP in 

the morning.  Appreciate recommendations per ICU medical management.  If patient

remains stable will likely be able to be discharged home tomorrow.




















The impression and plan of care has been dictated as directed.





Dr. Granados


I performed a history and examination of this patient,  discussed the same with 

the dictator.  I agree with the dictator's note ,documented as a scribe.  Any 

additional findings or plans will be noted.

## 2020-11-10 NOTE — P.CNPUL
History of Present Illness


Consult date: 11/10/20


Requesting physician: Carlos Solitario


Reason for consult: other


Chief complaint:  Severe back pain related to ruptured infrarenal abdominal 

aortic aneurysm


History of present illness: 


 This is a 80-year-old white male patient who follows with the [facility] clinic, 

and has a past medical history of chronic A. fib, COPD, not oxygen dependent at 

baseline, hypertension, 51-pack-year smoker, still smoking, hyperlipidemia, 

chronic kidney disease, sleep apnea not on CPAP, osteoarthritis, history of GI 

bleeding related to peptic ulcers, who came into the hospital on 11/09/2020 

through the emergency department for evaluation of right-sided abdominal pain 

that was a sharp and severe mostly on the right side of the abdomen with some 

radiation to the right side.  Evaluation with CT of the abdomen and pelvis 

revealed abdominal aortic aneurysm measuring roughly 9 cm in length with 

retroperitoneal hemorrhage suggesting weak and suspected impending rupture, 

urgent vascular surgical referral was placed, and patient underwent placement of

aortobiiliac endograft, and bare-metal expandable stent placed in the left 

external iliac artery by Dr. Barrios and Dr. Woody.  Patient was transferred to

the intensive care unit after observation in the recovery, he did extubate in 

the recovery, arrived in the intensive care unit later in the evening, slightly 

hypertensive, and drowsy, and his blood gases did reveal acute hypercapnic 

respiratory failure, with pO2 of 70, pCO2 of 82, and pH of 7.13, this was done 

on 44% FiO2, patient subsequently placed on BiPAP support, his subsequent blood 

gases showed improvement in the ventilation and oxygenation, patient was removed

from BiPAP support as he became more more alert, and he is currently on 2 L of 

oxygen, with pulse ox of 94-95%, hemodynamically he stable, he never required 

Cleviprex last night, blood pressure is 129/52, he is in atrial fibrillation 

with a controlled rate, he is been afebrile.  He denies any shortness of breath,

no cough or congestion, we'll provide him with incentive spirometer, he is on 

cefazolin following his surgery, his bilateral groin incisions are clean dry and

intact, his distal pulses are palpable.





Review of Systems


All systems: negative


Constitutional: Denies chills, Denies fever


Eyes: denies blurred vision, denies pain


Ears, nose, mouth and throat: Denies headache, Denies sore throat


Cardiovascular: Denies chest pain, Denies shortness of breath


Respiratory: Denies cough


Gastrointestinal: Reports abdominal pain, Denies diarrhea, Denies nausea, Denies

vomiting


Musculoskeletal: Denies myalgias


Integumentary: Denies pruritus, Denies rash


Neurological: Denies numbness, Denies weakness


Psychiatric: Denies anxiety, Denies depression


Endocrine: Denies fatigue, Denies weight change





Past Medical History


Past Medical History: Atrial Fibrillation, Cancer, COPD, Hyperlipidemia, 

Hypertension, Osteoarthritis (OA), Prostate Disorder, Renal Disease, Skin Diso

rder, Sleep Apnea/CPAP/BIPAP


Additional Past Medical History / Comment(s): 1960s had bleeding stomach ulcer 

with surgery, acute renal failure 4/2018, elevated PSA, gout bilateral ankles 

and toes both feet, chronic back pain, psoriasis, skin cancer with removals. No 

CPAP use.


History of Any Multi-Drug Resistant Organisms: None Reported


Past Surgical History: Appendectomy, Back Surgery, Joint Replacement


Additional Past Surgical History / Comment(s): Total left hip arthroplasty, 

gastric surgery for bleeding ulcer, 4 back surgeries, skin cancer removals.


Past Anesthesia/Blood Transfusion Reactions: Previous Problems w/ Anesthesia


Additional Past Anesthesia/Blood Transfusion Reaction / Comment(s): "Prefers not

to have Ativan."; extended recovery from anesthesia


Past Psychological History: No Psychological Hx Reported


Additional Psychological History / Comment(s): .


Smoking Status: Current every day smoker


Past Alcohol Use History: None Reported


Additional Past Alcohol Use History / Comment(s): .


Past Drug Use History: None Reported





- Past Family History


  ** Mother


Family Medical History: Cancer, Skin Disorder


Additional Family Medical History / Comment(s): Psoriasis, unknown to pt type of

cancer.





  ** Father


Family Medical History: No Reported History


Additional Family Medical History / Comment(s): Father was healthy.





  ** Brother(s)


Family Medical History: Cancer


Additional Family Medical History / Comment(s): COLON CANCER.





Medications and Allergies


                                Home Medications











 Medication  Instructions  Recorded  Confirmed  Type


 


Tamsulosin HCl [Flomax] 0.4 mg PO DAILY 05/31/18 11/09/20 History


 


allopurinoL [Zyloprim] 300 mg PO DAILY 05/31/18 11/09/20 History


 


atenoloL [Atenolol] 25 mg PO BID 05/31/18 11/09/20 History


 


Aspirin 325 mg PO DAILY #90 tab 04/24/19 11/09/20 Rx








                                    Allergies











Allergy/AdvReac Type Severity Reaction Status Date / Time


 


No Known Allergies Allergy   Verified 11/09/20 10:45














Physical Exam


Vitals: 


                                   Vital Signs











  Temp Pulse Pulse Pulse Resp BP BP


 


 11/10/20 10:00   93    41 H  123/71 


 


 11/10/20 09:00   103 H    16  126/81 


 


 11/10/20 08:00   111 H    18  115/77 


 


 11/10/20 07:45   110 H    12  126/68 


 


 11/10/20 07:35      16  


 


 11/10/20 07:30   124 H    22  111/74 


 


 11/10/20 07:15   96    15  123/71 


 


 11/10/20 07:00   122 H    16  106/81 


 


 11/10/20 06:45   114 H    17  100/63 


 


 11/10/20 06:30   112 H    16  96/70 


 


 11/10/20 06:15   115 H    14  115/69 


 


 11/10/20 06:00   99    15  88/76 


 


 11/10/20 05:45   109 H    17  114/81 


 


 11/10/20 05:30   128 H    17  109/67 


 


 11/10/20 05:15   113 H    15  86/56 


 


 11/10/20 05:00   115 H    18  115/65 


 


 11/10/20 04:45   124 H    17  116/69 


 


 11/10/20 04:30   111 H    17  126/69 


 


 11/10/20 04:15   114 H    14  103/75 


 


 11/10/20 04:00  98.3 F  112 H  105 H   14  109/67 


 


 11/10/20 03:45   101 H    15  106/62 


 


 11/10/20 03:30   104 H    16  117/70 


 


 11/10/20 03:15   109 H    15  127/73 


 


 11/10/20 03:00   137 H    14  109/64 


 


 11/10/20 02:45   113 H    16  102/66 


 


 11/10/20 02:30   134 H    15  101/87 


 


 11/10/20 02:15   129 H    17  116/68 


 


 11/10/20 02:00   114 H    16  112/82 


 


 11/10/20 01:45   117 H    14  110/62 


 


 11/10/20 01:30   123 H    13  109/64 


 


 11/10/20 01:15   109 H    18  111/96 


 


 11/10/20 01:00   128 H    13  113/70 


 


 11/10/20 00:45   118 H    15  92/63 


 


 11/10/20 00:30   120 H    15  122/91 


 


 11/10/20 00:15   124 H    13  117/80 


 


 11/10/20 00:00  97.8 F  104 H    13  129/75 


 


 11/09/20 23:45   115 H    12  126/64 


 


 11/09/20 23:36    113 H   20  


 


 11/09/20 23:30   101 H    13  132/80 


 


 11/09/20 23:15   98    10 L  114/79 


 


 11/09/20 23:00   101 H    12  120/81 


 


 11/09/20 22:45   107 H    13  97/60 


 


 11/09/20 22:30   105 H    18  112/67 


 


 11/09/20 22:15   103 H    26 H  99/63 


 


 11/09/20 22:00   125 H    20  106/67 


 


 11/09/20 21:45   112 H    14  106/70 


 


 11/09/20 21:30   110 H  113 H   15  120/69 


 


 11/09/20 21:15   112 H    12  118/75 


 


 11/09/20 21:00   104 H    11 L  130/100 


 


 11/09/20 20:45   101 H  106 H   12  124/66 


 


 11/09/20 20:30   109 H  112 H   12  120/69 


 


 11/09/20 20:15   98    24  115/61 


 


 11/09/20 20:00   105 H    21  106/65 


 


 11/09/20 19:45  97.9 F  94    20  117/75 


 


 11/09/20 19:00     110 H  16  


 


 11/09/20 18:45     113 H  16  


 


 11/09/20 18:15     114 H  16  


 


 11/09/20 18:00     98  16   143/78


 


 11/09/20 17:45     101 H  16   189/99


 


 11/09/20 17:29  97.7 F    111 H  16   131/89


 


 11/09/20 11:34   108 H    18  126/96 














  BP BP Pulse Ox


 


 11/10/20 10:00    94 L


 


 11/10/20 09:00    95


 


 11/10/20 08:00    91 L


 


 11/10/20 07:45    93 L


 


 11/10/20 07:35   


 


 11/10/20 07:30    96


 


 11/10/20 07:15    93 L


 


 11/10/20 07:00    96


 


 11/10/20 06:45    97


 


 11/10/20 06:30    94 L


 


 11/10/20 06:15    95


 


 11/10/20 06:00    94 L


 


 11/10/20 05:45    97


 


 11/10/20 05:30    96


 


 11/10/20 05:15    92 L


 


 11/10/20 05:00    93 L


 


 11/10/20 04:45    93 L


 


 11/10/20 04:30    95


 


 11/10/20 04:15    94 L


 


 11/10/20 04:00    93 L


 


 11/10/20 03:45    93 L


 


 11/10/20 03:30    93 L


 


 11/10/20 03:15    92 L


 


 11/10/20 03:00    92 L


 


 11/10/20 02:45    93 L


 


 11/10/20 02:30    95


 


 11/10/20 02:15    95


 


 11/10/20 02:00    97


 


 11/10/20 01:45    94 L


 


 11/10/20 01:30    94 L


 


 11/10/20 01:15    95


 


 11/10/20 01:00    93 L


 


 11/10/20 00:45    93 L


 


 11/10/20 00:30    96


 


 11/10/20 00:15    94 L


 


 11/10/20 00:00    95


 


 11/09/20 23:45    93 L


 


 11/09/20 23:36   


 


 11/09/20 23:30    91 L


 


 11/09/20 23:15   


 


 11/09/20 23:00    92 L


 


 11/09/20 22:45    89 L


 


 11/09/20 22:30    97


 


 11/09/20 22:15    96


 


 11/09/20 22:00    97


 


 11/09/20 21:45    97


 


 11/09/20 21:30    96


 


 11/09/20 21:15    88 L


 


 11/09/20 21:00    88 L


 


 11/09/20 20:45    94 L


 


 11/09/20 20:30    93 L


 


 11/09/20 20:15    90 L


 


 11/09/20 20:00    94 L


 


 11/09/20 19:45    91 L


 


 11/09/20 19:00  153/70  187/57  99


 


 11/09/20 18:45  150/67  171/56  99


 


 11/09/20 18:15  155/79   96


 


 11/09/20 18:00   181/61  96


 


 11/09/20 17:45   195/93  96


 


 11/09/20 17:29    97


 


 11/09/20 11:34    96








                                Intake and Output











 11/09/20 11/10/20 11/10/20





 22:59 06:59 14:59


 


Intake Total 1420 1175 300


 


Output Total 200 283 215


 


Balance 1220 892 85


 


Intake:   


 


   1175 300


 


    Sodium Chloride 0.9% 1, 250 1125 250





    000 ml @ 125 mls/hr IV .   





    Q8H ROBI Rx#:[id number]   


 


    ceFAZolin 1,000 mg In  50 50





    Sodium Chloride 0.9% 50   





    ml @ 100 mls/hr IVPB Q8HR   





    ROBI Rx#:[id number]   


 


  Blood Product 620  


 


    Rc As-1  Unit 310  





    [id number]   


 


    Rc As-1  Unit 310  





    [id number]   


 


Output:   


 


  Urine 200 283 215


 


Other:   


 


  Voiding Method Indwelling Catheter Indwelling Catheter Indwelling Catheter


 


  Weight 88.451 kg 97.6 kg 








                         ABP, PAP, CO, CI - Last 8 Hours











Arterial Blood Pressure        105/43


 


Arterial Blood Pressure        129/52


 


Arterial Blood Pressure        122/51


 


Arterial Blood Pressure        123/57


 


Arterial Blood Pressure        114/57


 


Arterial Blood Pressure        127/60


 


Arterial Blood Pressure        112/57


 


Arterial Blood Pressure        109/56


 


Arterial Blood Pressure        115/56


 


Arterial Blood Pressure        131/60


 


Arterial Blood Pressure        116/59


 


Arterial Blood Pressure        112/56


 


Arterial Blood Pressure        100/57


 


Arterial Blood Pressure        115/54


 


Arterial Blood Pressure        98/51


 


Arterial Blood Pressure        92/56


 


Arterial Blood Pressure        124/62


 


Arterial Blood Pressure        134/64


 


Arterial Blood Pressure        134/65


 


Arterial Blood Pressure        111/55


 


Arterial Blood Pressure        113/60


 


Arterial Blood Pressure        108/61














 GENERAL EXAM: Drowsy but arousable, 80-year-old white male, a 2 L of oxygen 

with pulse ox of 95%, comfortable in no apparent distress.


HEAD: Normocephalic/atraumatic.


EYES: Normal reaction of pupils, equal size.  Conjunctiva pink, sclera white.


NOSE: Clear with pink turbinates.


THROAT: No erythema or exudates.


NECK: No masses, no JVD, no thyroid enlargement, no adenopathy.


CHEST: No chest wall deformity.  Symmetrical expansion. 


LUNGS: Equal air entry with no crackles, wheeze, rhonchi or dullness.


CVS: Regular rate and rhythm, normal S1 and S2, no gallops, no murmurs, no rubs


ABDOMEN: Soft, nontender, obese.  No hepatosplenomegaly, normal bowel sounds, no

 guarding or rigidity.


EXTREMITIES: No clubbing, no edema, no cyanosis, 2+ pulses and upper and lower 

extremities.  Bilateral groin incisions covered with surgical dressings, clean 

dry and intact,


MUSCULOSKELETAL: Muscle strength and tone normal.


SPINE: No scoliosis or deformity


SKIN: No rashes


CENTRAL NERVOUS SYSTEM: Drowsy and oriented -3.  No focal deficits, tone is 

normal in all 4 extremities.


PSYCHIATRIC: Drowsy and oriented -3.  Appropriate affect.  Intact judgment and 

insight.











Results





- Laboratory Findings


CBC and BMP: 


                                 11/10/20 03:45





                                 11/10/20 03:45


ABG











ABG pH  7.22  (7.35-7.45)  L  11/09/20  21:47    


 


ABG pCO2  63 mmHg (35-45)  H  11/09/20  21:47    


 


ABG pO2  106 mmHg ()   11/09/20  21:47    


 


ABG O2 Saturation  98.1 % (94-97)  H  11/09/20  21:47    





PT/INR, D-dimer











PT  11.6 sec (9.0-12.0)   11/09/20  11:56    


 


INR  1.1  (<1.2)   11/09/20  11:56    








Abnormal lab findings: 


                                  Abnormal Labs











  11/09/20 11/09/20 11/09/20





  09:11 09:11 09:12


 


WBC    16.3 H


 


RBC   


 


Hgb   


 


Hct   


 


RDW   


 


Plt Count   


 


Neutrophils #    14.7 H


 


Lymphocytes #    0.8 L


 


Monocytes #   


 


ABG pH   


 


ABG pCO2   


 


ABG pO2   


 


ABG HCO3   


 


ABG Total CO2   


 


ABG O2 Saturation   


 


Chloride   


 


Glucose  195 H  


 


POC Glucose (mg/dL)   


 


Plasma Lactic Acid Francisco   2.1 H* 


 


Calcium   


 


Total Protein   


 


Albumin   


 


Ur Specific Gravity   


 


Urine Protein   


 


Urine Glucose (UA)   


 


Urine Blood   


 


Urine RBC   


 


Urine WBC   


 


Urine Mucus   


 


Crossmatch   














  11/09/20 11/09/20 11/09/20





  09:12 11:56 12:55


 


WBC   


 


RBC   


 


Hgb   


 


Hct   


 


RDW   


 


Plt Count   


 


Neutrophils #   


 


Lymphocytes #   


 


Monocytes #   


 


ABG pH    7.32 L


 


ABG pCO2   


 


ABG pO2    128 H


 


ABG HCO3   


 


ABG Total CO2   


 


ABG O2 Saturation    99.2 H


 


Chloride   


 


Glucose   


 


POC Glucose (mg/dL)   


 


Plasma Lactic Acid Francisco   


 


Calcium   


 


Total Protein   


 


Albumin   


 


Ur Specific Gravity   


 


Urine Protein  2+ H  


 


Urine Glucose (UA)  2+ H  


 


Urine Blood  Trace H  


 


Urine RBC   


 


Urine WBC   


 


Urine Mucus  Rare H  


 


Crossmatch   See Detail 














  11/09/20 11/09/20 11/09/20





  14:10 14:15 16:19


 


WBC  21.1 H  


 


RBC  3.59 L  


 


Hgb  10.2 L D  


 


Hct  31.5 L  


 


RDW   


 


Plt Count   


 


Neutrophils #   


 


Lymphocytes #   


 


Monocytes #   


 


ABG pH   


 


ABG pCO2   


 


ABG pO2   134 H  128 H


 


ABG HCO3   


 


ABG Total CO2    25 H


 


ABG O2 Saturation   99.6 H  99.7 H


 


Chloride   


 


Glucose   


 


POC Glucose (mg/dL)   


 


Plasma Lactic Acid Francisco   


 


Calcium   


 


Total Protein   


 


Albumin   


 


Ur Specific Gravity   


 


Urine Protein   


 


Urine Glucose (UA)   


 


Urine Blood   


 


Urine RBC   


 


Urine WBC   


 


Urine Mucus   


 


Crossmatch   














  11/09/20 11/09/20 11/09/20





  18:07 19:17 19:42


 


WBC   


 


RBC   


 


Hgb   


 


Hct   


 


RDW   


 


Plt Count   


 


Neutrophils #   


 


Lymphocytes #   


 


Monocytes #   


 


ABG pH   


 


ABG pCO2   


 


ABG pO2   


 


ABG HCO3   


 


ABG Total CO2   


 


ABG O2 Saturation   


 


Chloride   


 


Glucose   


 


POC Glucose (mg/dL)  116 H  133 H  143 H


 


Plasma Lactic Acid Francisco   


 


Calcium   


 


Total Protein   


 


Albumin   


 


Ur Specific Gravity   


 


Urine Protein   


 


Urine Glucose (UA)   


 


Urine Blood   


 


Urine RBC   


 


Urine WBC   


 


Urine Mucus   


 


Crossmatch   














  11/09/20 11/09/20 11/09/20





  19:58 21:47 Unknown


 


WBC    14.7 H


 


RBC    4.08 L


 


Hgb    11.7 L


 


Hct    35.8 L


 


RDW    15.9 H


 


Plt Count    114 L


 


Neutrophils #    12.5 H


 


Lymphocytes #   


 


Monocytes #    1.1 H


 


ABG pH  7.13 L*  7.22 L 


 


ABG pCO2  82 H*  63 H 


 


ABG pO2  70 L  


 


ABG HCO3  27 H  26 H 


 


ABG Total CO2  30 H  28 H 


 


ABG O2 Saturation  89.8 L  98.1 H 


 


Chloride   


 


Glucose   


 


POC Glucose (mg/dL)   


 


Plasma Lactic Acid Francisco   


 


Calcium   


 


Total Protein   


 


Albumin   


 


Ur Specific Gravity   


 


Urine Protein   


 


Urine Glucose (UA)   


 


Urine Blood   


 


Urine RBC   


 


Urine WBC   


 


Urine Mucus   


 


Crossmatch   














  11/09/20 11/10/20 11/10/20





  Unknown 03:45 03:45


 


WBC    15.2 H


 


RBC    3.66 L


 


Hgb    10.6 L


 


Hct    32.5 L


 


RDW    16.3 H


 


Plt Count    112 L


 


Neutrophils #    13.3 H


 


Lymphocytes #    0.7 L


 


Monocytes #    1.1 H


 


ABG pH   


 


ABG pCO2   


 


ABG pO2   


 


ABG HCO3   


 


ABG Total CO2   


 


ABG O2 Saturation   


 


Chloride  110 H  111 H 


 


Glucose  139 H  152 H 


 


POC Glucose (mg/dL)   


 


Plasma Lactic Acid Francisco   


 


Calcium  7.2 L  7.3 L 


 


Total Protein  5.0 L  


 


Albumin  2.8 L  


 


Ur Specific Gravity   


 


Urine Protein   


 


Urine Glucose (UA)   


 


Urine Blood   


 


Urine RBC   


 


Urine WBC   


 


Urine Mucus   


 


Crossmatch   














  11/10/20





  03:45


 


WBC 


 


RBC 


 


Hgb 


 


Hct 


 


RDW 


 


Plt Count 


 


Neutrophils # 


 


Lymphocytes # 


 


Monocytes # 


 


ABG pH 


 


ABG pCO2 


 


ABG pO2 


 


ABG HCO3 


 


ABG Total CO2 


 


ABG O2 Saturation 


 


Chloride 


 


Glucose 


 


POC Glucose (mg/dL) 


 


Plasma Lactic Acid Francisco 


 


Calcium 


 


Total Protein 


 


Albumin 


 


Ur Specific Gravity  >1.050 H


 


Urine Protein  1+ H


 


Urine Glucose (UA) 


 


Urine Blood  Moderate H


 


Urine RBC  53 H


 


Urine WBC  17 H


 


Urine Mucus  Occasional H


 


Crossmatch 














- Diagnostic Findings


Chest x-ray: report reviewed, image reviewed





Assessment and Plan


Plan: 


 Assessment:





#1.  Acute hypoxic respiratory failure related to hypoventilation, and 

atelectasis





#2.  Acute ruptured infrarenal abdominal aortic aneurysm, patient presented with

 right-sided abdominal pain, patient's status post urgent irritability a 

candidal graft, and stent placement to the left external iliac artery, 

postoperative day #1





#3.  Acute hypercapnic respiratory failure related to hypoventilation and 

atelectasis, requiring BiPAP support, improved, patient is currently on nasal ca

nnula





#4.  Extensive history of smoking, patient carries greater than 50-pack-year 

smoking history, still smoking one to 2 packs per day





#5.  Hypertension





#6.  Hyperlipidemia





#7.  COPD not oxygen dependent at baseline





#8.  BPH





#9.  Chronic back pain





#10.  Psoriasis





#11.  Obstructive sleep apnea not on CPAP therapy





Plan:





Provide incentive spirometry, today's chest x-ray has been reviewed, showing 

basilar atelectasis, no fever or chills, continue with antibiotics per vascular 

surgery, anticoagulation and antiplatelet therapy per vascular surgery.  Patient

 is hemodynamically stable, did not require Cleviprex last night, his labs have 

been reviewed.  Patient has been off BiPAP support this morning, use it when 

necessary and at bedtime, maintain pain control.  Continue close monitoring in 

the intensive care unit





I performed a history & physical examination of the patient and discussed their 

management with my nurse practitioner, Jesenia Walden.  I reviewed the nurse 

practitioner's note and agree with the documented findings and plan of care.  

Lung sounds are positive for diminished breath sounds.  The findings and the 

impression was discussed with the patient.  I attest to the documentation by the

 nurse practitioner. 





Time with Patient: Greater than 30

## 2020-11-10 NOTE — ECHOF
Referral Reason:LV function



MEASUREMENTS

--------

HEIGHT: 182.9 cm

WEIGHT: 97.5 kg

BP: 92/44

IVSd:   2.1 cm     (0.6 - 1.1)

LVIDd:   4.1 cm     (3.9 - 5.3)

LVPWd:   2.4 cm     (0.6 - 1.1)

IVSs:   2.9 cm

LVIDs:   2.1 cm

LVPWs:   2.6 cm

RVIDd:   3.2 cm     (< 3.3)

LAESV Index (A-L):   52.49 ml/m

Ao Diam:   3.1 cm     (2.0 - 3.7)

AV Cusp:   1.4 cm     (1.5 - 2.6)

EPSS:   1.4 cm

AV maxP.24 mmHg

AV meanP.02 mmHg

RAP:   20.00 mmHg

RVSP:   58.41 mmHg

MV EF SLOPE:   44.19 mm/s     (70 - 150)

MV EXCURSION:   14.92 mm     (> 18.000)







FINDINGS

--------

This was a technically adequate study.

The left ventricular size is normal.   There is severe concentric left ventricular hypertrophy.   Ove
rall left ventricular systolic function is low-normal with, an EF between 50 - 55 %.   Increased Lap 
Grade II Diastolic Dysfunction.

The right ventricle is normal in size.

LA is severely dilated >40 ml/m2

The right atrium is moderately enlarged.

Interatrial and interventricular septum intact.

There is no evidence of aortic regurgitation.   There is mild aortic stenosis present.   Peak/mean gr
adient across the Aortic Valve is 23.24mmHg / 14.02mmHg.

Moderate mitral regurgitation is present.

Moderate to severe tricuspid regurgitation present.   There is moderate to severe pulmonary hypertens
ion.   The right ventricular systolic pressure, as measured by Doppler, is 58.41mmHg.

There is no pulmonic regurgitation present.

The aortic root size is normal.

The inferior vena cava is dilated with no significant inspiratory collapse which is consistent estima
diana right atrial pressure of >20 mmHg.

There is a trivial pericardial effusion present.



CONCLUSIONS

--------

1. The left ventricular size is normal.

2. There is severe concentric left ventricular hypertrophy.

3. Overall left ventricular systolic function is low-normal with, an EF between 50 - 55 %.

4. Increased Lap Grade II Diastolic Dysfunction.

5. LA is severely dilated >40 ml/m2

6. The right atrium is moderately enlarged.

7. There is mild aortic stenosis present.

8. Peak/mean gradient across the Aortic Valve is 23.24mmHg / 14.02mmHg.

9. Moderate mitral regurgitation is present.

10. Moderate to severe tricuspid regurgitation present.

11. There is moderate to severe pulmonary hypertension.

12. The right ventricular systolic pressure, as measured by Doppler, is 58.41mmHg.

13. There is a trivial pericardial effusion present.





SONOGRAPHER: Oralia Awad RDCS

## 2020-11-10 NOTE — P.CRDCN
History of Present Illness


Consult date: 11/10/20


History of present illness: 





CHIEF COMPLAINT:  Atrial fibrillation





HISTORY OF PRESENT ILLNESS:  This is a 80-year old male with a past medical 

history significant for atrial fibrillation, COPD, hypertension, hyperlipidemia,

and GI bleed secondary to ulcers. Patient follows in the office with Dr. Coronado. 

We have been asked to see the patient in consultation for atrial fibrillation. 

Patient underwent infrarenal abdominal aortic aneurysm repair with aortobiiliac 

endograft. POD #1. Patient examined this morning at the bedside. He denies 

shortness of breath. Denies chest pain or pressure. Vital signs are stable.  He 

remains in atrial fibrillation with heart rate in the low 100s. He has not been 

receiving his beta blockers since being in the hospital.  Patient underwent 

cardiac catheterization with Dr. Coronado in April 2019 with stent placement to the 

OM2. According to cardiology office records, patient is not on anticoagulation 

because of history of GI bleeding and patient also refused anticoagulation.





DIAGNOSTICS:


EKG reveals atrial fibrillation with no signs of acute ischemia


Chest xray: Increased density right medial lung base may reflect developing 

infiltrate or atelectasis.  There is also mild increased density at the left 

medial lung base


Laboratory data: WBC 15.2.  Hemoglobin 10.6.  Platelet count 112.  Sodium 140.  

Potassium 5.1.  BUN 20.  Creatinine 1.21.  Lactic acid 1.1.


Current home cardiac medications include aspirin 325 mg daily and atenolol 25 mg

twice a day





REVIEW OF SYSTEMS: 


At the time of my exam:


CONSTITUTIONAL: Denies fever or chills.


HEENT: Denies blurred vision, vision changes, or eye pain. Denies hemoptysis 


CARDIOVASCULAR: Denies chest pain, orthopnea, PND or palpitations


RESPIRATORY: No shortness of breath. 


GASTROINTESTINAL: Denies abdominal pain. Denies nausea or vomiting. 


HEMATOLOGIC: Denies bleeding disorders.


GENITOURINARY:  Denies any blood in urine.


SKIN: Denies pruitis. Denies rash.





PHYSICAL EXAM: 


VITAL SIGNS: Reviewed.


GENERAL: Well-developed in no acute distress. 


HEENT: Head is normocephalic. Pupils are equal, round. Sclerae anicteric. Mucous

membranes of the mouth are moist. Neck supple. No JVD or thyromegaly


LUNGS: Respirations even and unlabored. Lungs essentially clear to auscultation 

bilaterally.


HEART: Irregular rate and rhythm.  S1 and S2 heard.


ABDOMEN: Soft. Nondistended. Nontender. Groin sites clean dry intact


EXTREMITIES: Normal range of motion.  No clubbing or cyanosis.  Peripheral 

pulses intact.  No lower extremity edema


NEUROLOGIC: Awake and alert. Oriented x 3. 





ASSESSMENT: 


Infrarenal abdominal aortic aneurysm repair with aortobiiliac endograft 


Chronic persistent atrial fibrillation, not on long-term anticoagulation 

secondary to history of GI bleeding


Coronary artery disease with previous PCI to OM 2


Hyperlipidemia


Nicotine dependence


Chronic back pain





PLAN: 


Resume atenolol. Monitor heart rate


No anticoagulation secondary to history of GI bleeding


Continue to hold aspirin therapy


Patient was taking Lipitor 80mg daily according to last office note. Will resume

Lipitor.


Obtain 2D echo to assess cardiac structure and function


Further recommendations pending patient course





Nurse practitioner note has been reviewed by physician. Signing provider agrees 

with the documented findings, assessment, and plan of care. 








Past Medical History


Past Medical History: Atrial Fibrillation, Cancer, COPD, Hyperlipidemia, 

Hypertension, Osteoarthritis (OA), Prostate Disorder, Renal Disease, Skin 

Disorder, Sleep Apnea/CPAP/BIPAP


Additional Past Medical History / Comment(s): 1960s had bleeding stomach ulcer 

with surgery, acute renal failure 4/2018, elevated PSA, gout bilateral ankles 

and toes both feet, chronic back pain, psoriasis, skin cancer with removals. No 

CPAP use.


History of Any Multi-Drug Resistant Organisms: None Reported


Past Surgical History: Appendectomy, Back Surgery, Joint Replacement


Additional Past Surgical History / Comment(s): Total left hip arthroplasty, 

gastric surgery for bleeding ulcer, 4 back surgeries, skin cancer removals.


Past Anesthesia/Blood Transfusion Reactions: Previous Problems w/ Anesthesia


Additional Past Anesthesia/Blood Transfusion Reaction / Comment(s): "Prefers not

to have Ativan."; extended recovery from anesthesia


Past Psychological History: No Psychological Hx Reported


Additional Psychological History / Comment(s): .


Smoking Status: Current every day smoker


Past Alcohol Use History: None Reported


Additional Past Alcohol Use History / Comment(s): .


Past Drug Use History: None Reported





- Past Family History


  ** Mother


Family Medical History: Cancer, Skin Disorder


Additional Family Medical History / Comment(s): Psoriasis, unknown to pt type of

cancer.





  ** Father


Family Medical History: No Reported History


Additional Family Medical History / Comment(s): Father was healthy.





  ** Brother(s)


Family Medical History: Cancer


Additional Family Medical History / Comment(s): COLON CANCER.





Medications and Allergies


                                Home Medications











 Medication  Instructions  Recorded  Confirmed  Type


 


Tamsulosin HCl [Flomax] 0.4 mg PO DAILY 05/31/18 11/09/20 History


 


allopurinoL [Zyloprim] 300 mg PO DAILY 05/31/18 11/09/20 History


 


atenoloL [Atenolol] 25 mg PO BID 05/31/18 11/09/20 History


 


Aspirin 325 mg PO DAILY #90 tab 04/24/19 11/09/20 Rx








                                    Allergies











Allergy/AdvReac Type Severity Reaction Status Date / Time


 


No Known Allergies Allergy   Verified 11/09/20 10:45














Physical Exam


Vitals: 


                                   Vital Signs











  Temp Pulse Pulse Pulse Resp BP BP


 


 11/10/20 12:00  98.6 F  122 H    22  126/71 


 


 11/10/20 11:00   107 H    19  123/74 


 


 11/10/20 10:00   93    41 H  123/71 


 


 11/10/20 09:00   103 H    16  126/81 


 


 11/10/20 08:00   111 H    18  115/77 


 


 11/10/20 07:45   110 H    12  126/68 


 


 11/10/20 07:35      16  


 


 11/10/20 07:30   124 H    22  111/74 


 


 11/10/20 07:15   96    15  123/71 


 


 11/10/20 07:00   122 H    16  106/81 


 


 11/10/20 06:45   114 H    17  100/63 


 


 11/10/20 06:30   112 H    16  96/70 


 


 11/10/20 06:15   115 H    14  115/69 


 


 11/10/20 06:00   99    15  88/76 


 


 11/10/20 05:45   109 H    17  114/81 


 


 11/10/20 05:30   128 H    17  109/67 


 


 11/10/20 05:15   113 H    15  86/56 


 


 11/10/20 05:00   115 H    18  115/65 


 


 11/10/20 04:45   124 H    17  116/69 


 


 11/10/20 04:30   111 H    17  126/69 


 


 11/10/20 04:15   114 H    14  103/75 


 


 11/10/20 04:00  98.3 F  112 H  105 H   14  109/67 


 


 11/10/20 03:45   101 H    15  106/62 


 


 11/10/20 03:30   104 H    16  117/70 


 


 11/10/20 03:15   109 H    15  127/73 


 


 11/10/20 03:00   137 H    14  109/64 


 


 11/10/20 02:45   113 H    16  102/66 


 


 11/10/20 02:30   134 H    15  101/87 


 


 11/10/20 02:15   129 H    17  116/68 


 


 11/10/20 02:00   114 H    16  112/82 


 


 11/10/20 01:45   117 H    14  110/62 


 


 11/10/20 01:30   123 H    13  109/64 


 


 11/10/20 01:15   109 H    18  111/96 


 


 11/10/20 01:00   128 H    13  113/70 


 


 11/10/20 00:45   118 H    15  92/63 


 


 11/10/20 00:30   120 H    15  122/91 


 


 11/10/20 00:15   124 H    13  117/80 


 


 11/10/20 00:00  97.8 F  104 H    13  129/75 


 


 11/09/20 23:45   115 H    12  126/64 


 


 11/09/20 23:36    113 H   20  


 


 11/09/20 23:30   101 H    13  132/80 


 


 11/09/20 23:15   98    10 L  114/79 


 


 11/09/20 23:00   101 H    12  120/81 


 


 11/09/20 22:45   107 H    13  97/60 


 


 11/09/20 22:30   105 H    18  112/67 


 


 11/09/20 22:15   103 H    26 H  99/63 


 


 11/09/20 22:00   125 H    20  106/67 


 


 11/09/20 21:45   112 H    14  106/70 


 


 11/09/20 21:30   110 H  113 H   15  120/69 


 


 11/09/20 21:15   112 H    12  118/75 


 


 11/09/20 21:00   104 H    11 L  130/100 


 


 11/09/20 20:45   101 H  106 H   12  124/66 


 


 11/09/20 20:30   109 H  112 H   12  120/69 


 


 11/09/20 20:15   98    24  115/61 


 


 11/09/20 20:00   105 H    21  106/65 


 


 11/09/20 19:45  97.9 F  94    20  117/75 


 


 11/09/20 19:00     110 H  16  


 


 11/09/20 18:45     113 H  16  


 


 11/09/20 18:15     114 H  16  


 


 11/09/20 18:00     98  16   143/78


 


 11/09/20 17:45     101 H  16   189/99


 


 11/09/20 17:29  97.7 F    111 H  16   131/89














  BP BP Pulse Ox


 


 11/10/20 12:00    95


 


 11/10/20 11:00    88 L


 


 11/10/20 10:00    94 L


 


 11/10/20 09:00    95


 


 11/10/20 08:00    91 L


 


 11/10/20 07:45    93 L


 


 11/10/20 07:35   


 


 11/10/20 07:30    96


 


 11/10/20 07:15    93 L


 


 11/10/20 07:00    96


 


 11/10/20 06:45    97


 


 11/10/20 06:30    94 L


 


 11/10/20 06:15    95


 


 11/10/20 06:00    94 L


 


 11/10/20 05:45    97


 


 11/10/20 05:30    96


 


 11/10/20 05:15    92 L


 


 11/10/20 05:00    93 L


 


 11/10/20 04:45    93 L


 


 11/10/20 04:30    95


 


 11/10/20 04:15    94 L


 


 11/10/20 04:00    93 L


 


 11/10/20 03:45    93 L


 


 11/10/20 03:30    93 L


 


 11/10/20 03:15    92 L


 


 11/10/20 03:00    92 L


 


 11/10/20 02:45    93 L


 


 11/10/20 02:30    95


 


 11/10/20 02:15    95


 


 11/10/20 02:00    97


 


 11/10/20 01:45    94 L


 


 11/10/20 01:30    94 L


 


 11/10/20 01:15    95


 


 11/10/20 01:00    93 L


 


 11/10/20 00:45    93 L


 


 11/10/20 00:30    96


 


 11/10/20 00:15    94 L


 


 11/10/20 00:00    95


 


 11/09/20 23:45    93 L


 


 11/09/20 23:36   


 


 11/09/20 23:30    91 L


 


 11/09/20 23:15   


 


 11/09/20 23:00    92 L


 


 11/09/20 22:45    89 L


 


 11/09/20 22:30    97


 


 11/09/20 22:15    96


 


 11/09/20 22:00    97


 


 11/09/20 21:45    97


 


 11/09/20 21:30    96


 


 11/09/20 21:15    88 L


 


 11/09/20 21:00    88 L


 


 11/09/20 20:45    94 L


 


 11/09/20 20:30    93 L


 


 11/09/20 20:15    90 L


 


 11/09/20 20:00    94 L


 


 11/09/20 19:45    91 L


 


 11/09/20 19:00  153/70  187/57  99


 


 11/09/20 18:45  150/67  171/56  99


 


 11/09/20 18:15  155/79   96


 


 11/09/20 18:00   181/61  96


 


 11/09/20 17:45   195/93  96


 


 11/09/20 17:29    97








                                Intake and Output











 11/09/20 11/10/20 11/10/20





 22:59 06:59 14:59


 


Intake Total 1420 1175 800


 


Output Total 200 283 310


 


Balance 1220 892 490


 


Intake:   


 


   1175 550


 


    Sodium Chloride 0.9% 1, 250 1125 500





    000 ml @ 125 mls/hr IV .   





    Q8H ROBI Rx#:[id number]   


 


    ceFAZolin 1,000 mg In  50 50





    Sodium Chloride 0.9% 50   





    ml @ 100 mls/hr IVPB Q8HR   





    [facility] Rx#:[id number]   


 


  Oral   250


 


  Blood Product 620  


 


    Rc As-1  Unit 310  





    [id number]   


 


    Rc As-1  Unit 310  





    [id number]   


 


Output:   


 


  Urine 200 283 310


 


Other:   


 


  Voiding Method Indwelling Catheter Indwelling Catheter Indwelling Catheter


 


  Weight 88.451 kg 97.6 kg 








                         ABP, PAP, CO, CI - Last 8 Hours











Arterial Blood Pressure        132/67


 


Arterial Blood Pressure        137/50


 


Arterial Blood Pressure        105/43


 


Arterial Blood Pressure        129/52


 


Arterial Blood Pressure        122/51


 


Arterial Blood Pressure        123/57


 


Arterial Blood Pressure        114/57


 


Arterial Blood Pressure        127/60


 


Arterial Blood Pressure        112/57


 


Arterial Blood Pressure        109/56


 


Arterial Blood Pressure        115/56


 


Arterial Blood Pressure        131/60


 


Arterial Blood Pressure        116/59


 


Arterial Blood Pressure        112/56


 


Arterial Blood Pressure        100/57


 


Arterial Blood Pressure        115/54


 


Arterial Blood Pressure        98/51


 


Arterial Blood Pressure        92/56


 


Arterial Blood Pressure        124/62

















Results





                                 11/10/20 03:45





                                 11/10/20 03:45


                                 Cardiac Enzymes











  11/09/20 Range/Units





  Unknown 


 


AST  18  (17-59)  U/L








                                   Coagulation











  11/09/20 Range/Units





  11:56 


 


PT  11.6  (9.0-12.0)  sec








                                       CBC











  11/09/20 11/09/20 11/10/20 Range/Units





  14:10 Unknown 03:45 


 


WBC  21.1 H  14.7 H  15.2 H  (3.8-10.6)  k/uL


 


RBC  3.59 L  4.08 L  3.66 L  (4.30-5.90)  m/uL


 


Hgb  10.2 L D  11.7 L  10.6 L  (13.0-17.5)  gm/dL


 


Hct  31.5 L  35.8 L  32.5 L  (39.0-53.0)  %


 


Plt Count  172  114 L  112 L  (150-450)  k/uL








                          Comprehensive Metabolic Panel











  11/09/20 11/10/20 Range/Units





  Unknown 03:45 


 


Sodium  142  140  (137-145)  mmol/L


 


Potassium  4.5  5.1  (3.5-5.1)  mmol/L


 


Chloride  110 H  111 H  ()  mmol/L


 


Carbon Dioxide  27  26  (22-30)  mmol/L


 


BUN  16  20  (9-20)  mg/dL


 


Creatinine  0.91  1.21  (0.66-1.25)  mg/dL


 


Glucose  139 H  152 H  (74-99)  mg/dL


 


Calcium  7.2 L  7.3 L  (8.4-10.2)  mg/dL


 


AST  18   (17-59)  U/L


 


ALT  9   (4-49)  U/L


 


Alkaline Phosphatase  57   ()  U/L


 


Total Protein  5.0 L   (6.3-8.2)  g/dL


 


Albumin  2.8 L   (3.5-5.0)  g/dL








                               Current Medications











Generic Name Dose Route Start Last Admin





  Trade Name Freq  PRN Reason Stop Dose Admin


 


Allopurinol  300 mg  11/10/20 10:30  11/10/20 11:24





  Allopurinol 300 Mg Tab  PO   300 mg





  DAILY ROBI   Administration


 


Atenolol  25 mg  11/10/20 09:00  11/10/20 09:52





  Atenolol 25 Mg Tab  PO   25 mg





  BID ROBI   Administration


 


Sodium Chloride  1,000 mls @ 125 mls/hr  11/09/20 17:45  11/10/20 00:33





  Saline 0.9%  IV   125 mls/hr





  .Q8H ROBI   Administration


 


Cefazolin Sodium 1,000 mg/  50 mls @ 100 mls/hr  11/10/20 00:00  11/10/20 08:24





  Sodium Chloride  IVPB   100 mls/hr





  Q8HR ROBI   Administration


 


Clevidipine 25 mg/ IV Solution  50 mls @ 2 mls/hr  11/09/20 21:15  11/10/20 

00:08





  IV   Not Given





  .Q24H ROBI  





  Protocol  





  1 MG/HR  


 


Naloxone HCl  0.2 mg  11/09/20 11:16 





  Naloxone 0.4 Mg/Ml 1 Ml Vial  IV  





  Q2M PRN  





  Opioid Reversal  


 


Pantoprazole Sodium  40 mg  11/11/20 07:30 





  Pantoprazole 40 Mg Tablet  PO  





  AC-[id number] ROBI  


 


Tamsulosin HCl  0.4 mg  11/10/20 10:30  11/10/20 11:24





  Tamsulosin 0.4 Mg Cap.Er.24h  PO   0.4 mg





  DAILY ROBI   Administration








                                Intake and Output











 11/09/20 11/10/20 11/10/20





 22:59 06:59 14:59


 


Intake Total 1420 1175 800


 


Output Total 200 283 310


 


Balance 1220 892 490


 


Intake:   


 


   1175 550


 


    Sodium Chloride 0.9% 1, 250 1125 500





    000 ml @ 125 mls/hr IV .   





    Q8H [facility] Rx#:[id number]   


 


    ceFAZolin 1,000 mg In  50 50





    Sodium Chloride 0.9% 50   





    ml @ 100 mls/hr IVPB Q8HR   





    [facility] Rx#:[id number]   


 


  Oral   250


 


  Blood Product 620  


 


    Rc As-1  Unit 310  





    [id number]   


 


    Rc As-1  Unit 310  





    [id number]   


 


Output:   


 


  Urine 200 283 310


 


Other:   


 


  Voiding Method Indwelling Catheter Indwelling Catheter Indwelling Catheter


 


  Weight 88.451 kg 97.6 kg 








                                        





                                 11/10/20 03:45 





                                 11/10/20 03:45

## 2020-11-11 LAB
ANION GAP SERPL CALC-SCNC: 5 MMOL/L
BASOPHILS # BLD AUTO: 0 K/UL (ref 0–0.2)
BASOPHILS NFR BLD AUTO: 0 %
BUN SERPL-SCNC: 24 MG/DL (ref 9–20)
CALCIUM SPEC-MCNC: 8 MG/DL (ref 8.4–10.2)
CHLORIDE SERPL-SCNC: 111 MMOL/L (ref 98–107)
CO2 SERPL-SCNC: 23 MMOL/L (ref 22–30)
EOSINOPHIL # BLD AUTO: 0 K/UL (ref 0–0.7)
EOSINOPHIL NFR BLD AUTO: 0 %
ERYTHROCYTE [DISTWIDTH] IN BLOOD BY AUTOMATED COUNT: 3.12 M/UL (ref 4.3–5.9)
ERYTHROCYTE [DISTWIDTH] IN BLOOD: 16.3 % (ref 11.5–15.5)
GLUCOSE SERPL-MCNC: 140 MG/DL (ref 74–99)
HCT VFR BLD AUTO: 28.3 % (ref 39–53)
HGB BLD-MCNC: 9 GM/DL (ref 13–17.5)
LYMPHOCYTES # SPEC AUTO: 0.6 K/UL (ref 1–4.8)
LYMPHOCYTES NFR SPEC AUTO: 4 %
MCH RBC QN AUTO: 28.9 PG (ref 25–35)
MCHC RBC AUTO-ENTMCNC: 32 G/DL (ref 31–37)
MCV RBC AUTO: 90.5 FL (ref 80–100)
MONOCYTES # BLD AUTO: 1 K/UL (ref 0–1)
MONOCYTES NFR BLD AUTO: 8 %
NEUTROPHILS # BLD AUTO: 11.9 K/UL (ref 1.3–7.7)
PLATELET # BLD AUTO: 108 K/UL (ref 150–450)
POTASSIUM SERPL-SCNC: 4.4 MMOL/L (ref 3.5–5.1)
SODIUM SERPL-SCNC: 139 MMOL/L (ref 137–145)
WBC # BLD AUTO: 13.7 K/UL (ref 3.8–10.6)

## 2020-11-11 RX ADMIN — ATORVASTATIN CALCIUM SCH MG: 80 TABLET, FILM COATED ORAL at 20:30

## 2020-11-11 RX ADMIN — CEFAZOLIN SCH: 330 INJECTION, POWDER, FOR SOLUTION INTRAMUSCULAR; INTRAVENOUS at 20:42

## 2020-11-11 RX ADMIN — CEFAZOLIN SCH: 330 INJECTION, POWDER, FOR SOLUTION INTRAMUSCULAR; INTRAVENOUS at 20:54

## 2020-11-11 RX ADMIN — PANTOPRAZOLE SODIUM SCH MG: 40 TABLET, DELAYED RELEASE ORAL at 07:59

## 2020-11-11 RX ADMIN — CLEVIPIDINE SCH: 0.5 EMULSION INTRAVENOUS at 19:44

## 2020-11-11 RX ADMIN — TAMSULOSIN HYDROCHLORIDE SCH MG: 0.4 CAPSULE ORAL at 07:59

## 2020-11-11 NOTE — P.PN
Subjective


Progress Note Date: 11/11/20





HISTORY OF PRESENT ILLNESS:  Patient is s/p infrarenal abdominal aortic aneurysm

repair with aortobiiliac endograft. POD #2. Patient examined this morning at the

bedside in the ICU. He denies shortness of breath. Denies chest pain or 

pressure. Vital signs are stable.  He remains in atrial fibrillation with heart 

rate in the low 100s. Blood pressure stable.  Echocardiogram completed yesterday

reveals ejection fraction between 50 and 55%, mild aortic stenosis, moderate 

mitral regurgitation, moderate to severe tricuspid regurgitation, and moderate 

to severe pulmonary hypertension.





PHYSICAL EXAM: 


VITAL SIGNS: Reviewed.


GENERAL: Well-developed in no acute distress. 


HEENT: Head is normocephalic. Pupils are equal, round. Sclerae anicteric. Mucous

membranes of the mouth are moist. Neck supple. No JVD or thyromegaly


LUNGS: Respirations even and unlabored. Lungs essentially clear to auscultation 

bilaterally.


HEART: Irregular rate and rhythm.  S1 and S2 heard.


ABDOMEN: Soft. Nondistended. Nontender. Groin sites clean dry intact


EXTREMITIES: Normal range of motion.  No clubbing or cyanosis.  Peripheral 

pulses intact.  No lower extremity edema


NEUROLOGIC: Awake and alert. Oriented x 3. 





ASSESSMENT: 


Infrarenal abdominal aortic aneurysm repair with aortobiiliac endograft 


Chronic persistent atrial fibrillation, not on long-term anticoagulation 

secondary to history of GI bleeding


Coronary artery disease with previous PCI to OM 2


Hyperlipidemia


Nicotine dependence


Chronic back pain





PLAN: 


Continue current dose of Atenolol


Continue telemetry monitoring


No anticoagulation secondary to history of GI bleeding


Continue to hold aspirin therapy. Resume when okay with vascular surgery.


Further recommendations pending patient course





Nurse practitioner note has been reviewed by physician. Signing provider agrees 

with the documented findings, assessment, and plan of care. 








Objective





- Vital Signs


Vital signs: 


                                   Vital Signs











Temp  98.2 F   11/11/20 08:00


 


Pulse  86   11/11/20 09:00


 


Resp  26 H  11/11/20 09:00


 


BP  104/80   11/11/20 09:00


 


Pulse Ox  96   11/11/20 09:00








                                 Intake & Output











 11/10/20 11/11/20 11/11/20





 18:59 06:59 18:59


 


Intake Total 1600 1500 425


 


Output Total 465 500 200


 


Balance 1135 1000 225


 


Weight  99.2 kg 


 


Intake:   


 


  IV 1350 1500 425


 


    Sodium Chloride 0.9% 1, 1250 1500 375





    000 ml @ 125 mls/hr IV .   





    Q8H [facility] Rx#:[id number]   


 


    ceFAZolin 1,000 mg In 100  50





    Sodium Chloride 0.9% 50   





    ml @ 100 mls/hr IVPB Q8HR   





    [facility] Rx#:[id number]   


 


  Oral 250  


 


Output:   


 


  Urine 465 500 200


 


Other:   


 


  Voiding Method Indwelling Catheter Urinal Urinal


 


  # Voids  0 1








                       ABP, PAP, CO, CI - Last Documented











Arterial Blood Pressure        107/48

















- Labs


CBC & Chem 7: 


                                 11/11/20 03:13





                                 11/11/20 03:13


Labs: 


                  Abnormal Lab Results - Last 24 Hours (Table)











  11/11/20 11/11/20 Range/Units





  03:13 03:13 


 


WBC  13.7 H   (3.8-10.6)  k/uL


 


RBC  3.12 L   (4.30-5.90)  m/uL


 


Hgb  9.0 L D   (13.0-17.5)  gm/dL


 


Hct  28.3 L   (39.0-53.0)  %


 


RDW  16.3 H   (11.5-15.5)  %


 


Plt Count  108 L   (150-450)  k/uL


 


Neutrophils #  11.9 H   (1.3-7.7)  k/uL


 


Lymphocytes #  0.6 L   (1.0-4.8)  k/uL


 


Chloride   111 H  ()  mmol/L


 


BUN   24 H  (9-20)  mg/dL


 


Glucose   140 H  (74-99)  mg/dL


 


Calcium   8.0 L  (8.4-10.2)  mg/dL








                      Microbiology - Last 24 Hours (Table)











 11/10/20 03:45 Urine Culture - Final





 Urine,Catheterized

## 2020-11-11 NOTE — P.DS
Providers


Date of admission: 


11/09/20 11:20





Attending physician: 


Carlos Solitario DO





Consults: 





                                        





11/09/20 10:38


Consult Physician Stat 


   Consulting Provider: Carlos Solitario


   Consult Reason/Comments: vascular consult from [facility]


   Do you want consulting provider notified?: Already Contacted





11/09/20 17:51


Consult Physician Urgent 


   Consulting Provider: Kurt Saeed


   Consult Reason/Comments: ICu management


   Do you want consulting provider notified?: Yes





11/10/20 08:27


Consult Physician Urgent 


   Consulting Provider: Bharat Coronado


   Consult Reason/Comments: afib


   Do you want consulting provider notified?: Already Contacted











Primary care physician: 


[facility] Course: 





A 80-year-old  male patient who came into the emergency department 2 

days ago with complaints of severe back pain and was noted to have been ruptured

abdominal aortic aneurysm.  Is postop day #2 for infrarenal abdominal aortic 

aneurysm repair with aortobiiliac endograft.  His past medical history does 

include chronic atrial fibrillation, COPD, hypertension, hyperlipidemia, current

smoker, chronic kidney disease and obstructive sleep apnea.  Etiologies on 

consult and ordered an echocardiogram which showed severe concentric left 

ventricular hypertrophy, left ventricular systolic function is low-normal with 

an EF of 50-55% there is increased left great to diastolic dysfunction.  LAD is 

severely dilated, right atrium is moderately enlarged, there is mild aortic 

stenosis present, moderate mitral regurgitation, moderate to severe tricuspid 

regurgitation, moderate to severe pulmonary hypertension, and trivial 

pericardial effusion present.  ICU intensivists continue to follow patient 

medically and recommend continued close monitoring in the intensive care unit.  

The patient is denying any shortness of breath, chest pain, or abdominal pain.  

The is wanting to go home and has expressed his desire to be discharged home.  

However during the evening he is desaturating into the 80s and requiring BiPAP.


Assessment: 


General appearance: The patient is alert, oriented, in no acute distress.


HET: Head is normocephalic and atraumatic.  Pupils are equal and reactive.  

Oropharynx is clear without lesions.


Neck: Supple without lymphadenopathy.  Trachea midline.


Heart: S1 S2.  Regular rate and rhythm.


Lungs: No crackles or wheezes are heard.


Abdomen: Soft, nontender, nondistended with  bowel sounds.


Groin: Bilateral groin incisions clean dry and intact.  Left groin with sutures,

no active bleeding or drainage noted. 


Extremities: Normal skin color and turgor.  Bilateral dorsalis pedis Doppler 

signals.  Able to move bilateral lower extremities.


Neurological: No focal deficits.  Strength and sensation are grossly intact.











Assessment:


1.  Ruptured infrarenal abdominal aortic aneurysm


2.  Postop day #2 for infrarenal aortic aneurysm repair with biiliac endograft


3.  Chronic atrial fibrillation


4.  Hypertension


5.  Hyperlipidemia next line 6.  Obstructive sleep apnea


7.  Current smoker extensive history of smoking


8.  COPD





Plan:


Consult physical therapy to increase activity as tolerated


Appreciate recommendations from cardiology


Appreciate recommendations from ICU intensivists


Pulmonology to evaluate need for home CPAP machine


Recommend close monitoring and hold discharge at this time.

















The impression and plan of care has been dictated as directed.





Dr. Woody


I performed a history and examination of this patient,  discussed the same with 

the dictator.  I agree with the dictator's note ,documented as a scribe.  Any 

additional findings or plans will be noted.


Procedures: 





Infrarenal abdominal aortic aneurysm repair with aortobiiliac endograft


Patient Condition at Discharge: Fair





Plan - Discharge Summary


Discharge Rx Participant: No


New Discharge Prescriptions: 


No Action


   Tamsulosin HCl [Flomax] 0.4 mg PO DAILY


   allopurinoL [Zyloprim] 300 mg PO DAILY


   atenoloL [Atenolol] 25 mg PO BID


   Aspirin 325 mg PO DAILY #90 tab


Discharge Medication List





Tamsulosin HCl [Flomax] 0.4 mg PO DAILY 05/31/18 [History]


allopurinoL [Zyloprim] 300 mg PO DAILY 05/31/18 [History]


atenoloL [Atenolol] 25 mg PO BID 05/31/18 [History]


Aspirin 325 mg PO DAILY #90 tab 04/24/19 [Rx]








Follow up Appointment(s)/Referral(s): 


[facility] [Primary Care Provider] - 1-2 days


Carlos Solitario DO [Doctor of Osteopathic Medicine] - 1 Week


Activity/Diet/Wound Care/Special Instructions: 


No lifting greater than 5 pounds, no strenuous activity.  No driving for at 

least 2 weeks.  A shower but needs to keep bilateral groin sites clean and dry.

## 2020-11-11 NOTE — XR
EXAMINATION TYPE: XR chest 1V

 

DATE OF EXAM: 11/11/2020

 

COMPARISON: 11/10/2020

 

HISTORY: 80-year-old male O2 use

 

TECHNIQUE: Single frontal view of the chest is obtained.

 

FINDINGS:  

Heart mildly enlarged. Interstitial opacities persist along with small effusions.

 

IMPRESSION:  

Continued mild cardiomegaly, interstitial densities, and trace effusions. Correlate for mild CHF.

## 2020-11-11 NOTE — P.PN
Subjective


Progress Note Date: 11/11/20


Principal diagnosis: 


 Severe back pain, ruptured infrarenal AAA





This is a 80-year-old white male patient who follows with the [facility] clinic, 

and has a past medical history of chronic A. fib, COPD, not oxygen dependent at 

baseline, hypertension, 51-pack-year smoker, still smoking, hyperlipidemia, 

chronic kidney disease, sleep apnea not on CPAP, osteoarthritis, history of GI 

bleeding related to peptic ulcers, who came into the hospital on 11/09/2020 

through the emergency department for evaluation of right-sided abdominal pain 

that was a sharp and severe mostly on the right side of the abdomen with some 

radiation to the right side.  Evaluation with CT of the abdomen and pelvis 

revealed abdominal aortic aneurysm measuring roughly 9 cm in length with 

retroperitoneal hemorrhage suggesting weak and suspected impending rupture, 

urgent vascular surgical referral was placed, and patient underwent placement of

aortobiiliac endograft, and bare-metal expandable stent placed in the left 

external iliac artery by Dr. Barrios and Dr. Woody.  Patient was transferred to

the intensive care unit after observation in the recovery, he did extubate in 

the recovery, arrived in the intensive care unit later in the evening, slightly 

hypertensive, and drowsy, and his blood gases did reveal acute hypercapnic 

respiratory failure, with pO2 of 70, pCO2 of 82, and pH of 7.13, this was done 

on 44% FiO2, patient subsequently placed on BiPAP support, his subsequent blood 

gases showed improvement in the ventilation and oxygenation, patient was removed

from BiPAP support as he became more more alert, and he is currently on 2 L of 

oxygen, with pulse ox of 94-95%, hemodynamically he stable, he never required 

Cleviprex last night, blood pressure is 129/52, he is in atrial fibrillation wi

th a controlled rate, he is been afebrile.  He denies any shortness of breath, 

no cough or congestion, we'll provide him with incentive spirometer, he is on 

cefazolin following his surgery, his bilateral groin incisions are clean dry and

intact, his distal pulses are palpable.





On 11/11/2020 patient seen in follow-up in the intensive care unit, this is 

postoperative day number 2, status post aortobiiliac endograft with stenting of 

the left external iliac artery.  Patient apparently has been very restless 

through the night, still remains a bit drowsy however more awake on today's 

exam, more responsive, he is responding properly, he is oriented 3, however he 

is been very impulsive according to the nursing staff, he gets up unassisted, 

and frequently Using the urinal.  Woody catheter has been discontinued, 

currently on 5 L of oxygen her pulse ox of 95-96%, lung sounds reveal diffuse 

crackles.  His been afebrile, he has not been working on his incentive 

spirometer very well, although at times she is able to achieve 1.5 L according 

to the nursing staff.  Bilateral groin incisions are clean dry and intact, 

hemodynamically he stable, he is just some point and was seen at rate of 125 ML 

per hour which was discontinued, he is tolerating oral intake.  Today's labs 

have been reviewed, showing white blood cell, 13.7, hemoglobin of 9.0, sodium is

139, potassium is 4.4, chloride is 111, CO2 23, BUN of 24 creatinine is 1.23.  

His urinalysis revealed possibility of urinary tract infection, his urine 

culture is pending at this time, remains on IV cefazolin per vascular surgery. 








Objective





- Vital Signs


Vital signs: 


                                   Vital Signs











Temp  98.2 F   11/11/20 08:00


 


Pulse  86   11/11/20 09:00


 


Resp  26 H  11/11/20 09:00


 


BP  104/80   11/11/20 09:00


 


Pulse Ox  96   11/11/20 09:00








                                 Intake & Output











 11/10/20 11/11/20 11/11/20





 18:59 06:59 18:59


 


Intake Total 1600 1500 425


 


Output Total 465 500 200


 


Balance 1135 1000 225


 


Weight  99.2 kg 


 


Intake:   


 


  IV 1350 1500 425


 


    Sodium Chloride 0.9% 1, 1250 1500 375





    000 ml @ 125 mls/hr IV .   





    Q8H ROBI Rx#:[id number]   


 


    ceFAZolin 1,000 mg In 100  50





    Sodium Chloride 0.9% 50   





    ml @ 100 mls/hr IVPB Q8HR   





    ROBI Rx#:[id number]   


 


  Oral 250  


 


Output:   


 


  Urine 465 500 200


 


Other:   


 


  Voiding Method Indwelling Catheter Urinal Urinal


 


  # Voids  0 1








                       ABP, PAP, CO, CI - Last Documented











Arterial Blood Pressure        107/48

















- Exam


GENERAL EXAM: Drowsy but arousable, 80-year-old white male, a 5 L of oxygen with

pulse ox of 96%, comfortable in no apparent distress.


HEAD: Normocephalic/atraumatic.


EYES: Normal reaction of pupils, equal size.  Conjunctiva pink, sclera white.


NOSE: Clear with pink turbinates.


THROAT: No erythema or exudates.


NECK: No masses, no JVD, no thyroid enlargement, no adenopathy.


CHEST: No chest wall deformity.  Symmetrical expansion. 


LUNGS: Equal air entry with no crackles, wheeze, rhonchi or dullness.


CVS: Regular rate and rhythm, normal S1 and S2, no gallops, no murmurs, no rubs


ABDOMEN: Soft, nontender, obese.  No hepatosplenomegaly, normal bowel sounds, no

guarding or rigidity.


EXTREMITIES: No clubbing, no edema, no cyanosis, 2+ pulses and upper and lower 

extremities.  Bilateral groin incisions covered with surgical dressings, clean 

dry and intact,


MUSCULOSKELETAL: Muscle strength and tone normal.


SPINE: No scoliosis or deformity


SKIN: No rashes


CENTRAL NERVOUS SYSTEM: Drowsy and oriented -3.  No focal deficits, tone is 

normal in all 4 extremities.


PSYCHIATRIC: Drowsy and oriented -3.  Appropriate affect.  Intact judgment and 

insight.











- Labs


CBC & Chem 7: 


                                 11/11/20 03:13





                                 11/11/20 03:13


Labs: 


                  Abnormal Lab Results - Last 24 Hours (Table)











  11/11/20 11/11/20 Range/Units





  03:13 03:13 


 


WBC  13.7 H   (3.8-10.6)  k/uL


 


RBC  3.12 L   (4.30-5.90)  m/uL


 


Hgb  9.0 L D   (13.0-17.5)  gm/dL


 


Hct  28.3 L   (39.0-53.0)  %


 


RDW  16.3 H   (11.5-15.5)  %


 


Plt Count  108 L   (150-450)  k/uL


 


Neutrophils #  11.9 H   (1.3-7.7)  k/uL


 


Lymphocytes #  0.6 L   (1.0-4.8)  k/uL


 


Chloride   111 H  ()  mmol/L


 


BUN   24 H  (9-20)  mg/dL


 


Glucose   140 H  (74-99)  mg/dL


 


Calcium   8.0 L  (8.4-10.2)  mg/dL








                      Microbiology - Last 24 Hours (Table)











 11/10/20 03:45 Urine Culture - Preliminary





 Urine,Catheterized 














Assessment and Plan


Plan: 


 Assessment:





#1.  Acute hypoxic respiratory failure related to hypoventilation, and 

atelectasis





#2.  Acute ruptured infrarenal abdominal aortic aneurysm, patient presented with

right-sided abdominal pain, patient's status post urgent aortobiliac endograft, 

and stent placement to the left external iliac artery, postoperative day #2





#3.  Acute hypercapnic respiratory failure related to hypoventilation and 

atelectasis, requiring BiPAP support, improved, patient is currently on nasal 

cannula





#4.  Extensive history of smoking, patient carries greater than 50-pack-year 

smoking history, still smoking one to 2 packs per day





#5.  Hypertension





#6.  Hyperlipidemia





#7.  COPD not oxygen dependent at baseline





#8.  BPH





#9.  Chronic back pain





#10.  Psoriasis





#11.  Obstructive sleep apnea not on CPAP therapy





Plan:





We'll obtain chest x-ray, DC the IV fluids, wean FiO2, aggressive pulmonary 

toileting is needed, encourage patient to work on his incentive spirometer, sit 

up in a chair, and teen safety precautions, fall precautions.  Hemodynamically 

stable, today's labs have been reviewed.  Wean FiO2.  Continue breathing 

treatments.  Chest x-ray has been reviewed, showing possibility retrocardiac 

opacity, and bibasilar atelectasis, will continue with current antibiotics, 

lisinopril calcitonin level, patient needs aggressive pulmonary toileting.  

Continue closely monitoring the intensive care unit.





I performed a history & physical examination of the patient and discussed their 

management with my nurse practitioner, Jesenia Walden.  I reviewed the nurse 

practitioner's note and agree with the documented findings and plan of care.  

Lung sounds are positive for diminished breath sounds.  The findings and the 

impression was discussed with the patient.  I attest to the documentation by the

nurse practitioner. 





Time with Patient: Greater than 30

## 2020-11-12 VITALS — TEMPERATURE: 98.1 F | DIASTOLIC BLOOD PRESSURE: 57 MMHG | SYSTOLIC BLOOD PRESSURE: 104 MMHG | HEART RATE: 94 BPM

## 2020-11-12 VITALS — RESPIRATION RATE: 20 BRPM

## 2020-11-12 RX ADMIN — PANTOPRAZOLE SODIUM SCH MG: 40 TABLET, DELAYED RELEASE ORAL at 09:11

## 2020-11-12 RX ADMIN — TAMSULOSIN HYDROCHLORIDE SCH MG: 0.4 CAPSULE ORAL at 09:11

## 2020-11-12 NOTE — P.PN
Subjective


Progress Note Date: 11/12/20


Principal diagnosis: 


 Severe back pain, ruptured infrarenal AAA





This is a 80-year-old white male patient who follows with the [facility] clinic, 

and has a past medical history of chronic A. fib, COPD, not oxygen dependent at 

baseline, hypertension, 51-pack-year smoker, still smoking, hyperlipidemia, 

chronic kidney disease, sleep apnea not on CPAP, osteoarthritis, history of GI 

bleeding related to peptic ulcers, who came into the hospital on 11/09/2020 

through the emergency department for evaluation of right-sided abdominal pain 

that was a sharp and severe mostly on the right side of the abdomen with some 

radiation to the right side.  Evaluation with CT of the abdomen and pelvis 

revealed abdominal aortic aneurysm measuring roughly 9 cm in length with 

retroperitoneal hemorrhage suggesting weak and suspected impending rupture, 

urgent vascular surgical referral was placed, and patient underwent placement of

aortobiiliac endograft, and bare-metal expandable stent placed in the left 

external iliac artery by Dr. Barrios and Dr. Woody.  Patient was transferred to

the intensive care unit after observation in the recovery, he did extubate in 

the recovery, arrived in the intensive care unit later in the evening, slightly 

hypertensive, and drowsy, and his blood gases did reveal acute hypercapnic 

respiratory failure, with pO2 of 70, pCO2 of 82, and pH of 7.13, this was done 

on 44% FiO2, patient subsequently placed on BiPAP support, his subsequent blood 

gases showed improvement in the ventilation and oxygenation, patient was removed

from BiPAP support as he became more more alert, and he is currently on 2 L of 

oxygen, with pulse ox of 94-95%, hemodynamically he stable, he never required 

Cleviprex last night, blood pressure is 129/52, he is in atrial fibrillation wi

th a controlled rate, he is been afebrile.  He denies any shortness of breath, 

no cough or congestion, we'll provide him with incentive spirometer, he is on 

cefazolin following his surgery, his bilateral groin incisions are clean dry and

intact, his distal pulses are palpable.





On 11/11/2020 patient seen in follow-up in the intensive care unit, this is 

postoperative day number 2, status post aortobiiliac endograft with stenting of 

the left external iliac artery.  Patient apparently has been very restless 

through the night, still remains a bit drowsy however more awake on today's 

exam, more responsive, he is responding properly, he is oriented 3, however he 

is been very impulsive according to the nursing staff, he gets up unassisted, 

and frequently Using the urinal.  Woody catheter has been discontinued, 

currently on 5 L of oxygen her pulse ox of 95-96%, lung sounds reveal diffuse 

crackles.  His been afebrile, he has not been working on his incentive 

spirometer very well, although at times she is able to achieve 1.5 L according 

to the nursing staff.  Bilateral groin incisions are clean dry and intact, 

hemodynamically he stable, he is just some point and was seen at rate of 125 ML 

per hour which was discontinued, he is tolerating oral intake.  Today's labs 

have been reviewed, showing white blood cell, 13.7, hemoglobin of 9.0, sodium is

139, potassium is 4.4, chloride is 111, CO2 23, BUN of 24 creatinine is 1.23.  

His urinalysis revealed possibility of urinary tract infection, his urine 

culture is pending at this time, remains on IV cefazolin per vascular surgery. 





On 11/12/2020 patient seen in follow-up on the medical surgical floor, today is 

postoperative day #3, status posta ortobiiliac endograft with stenting of the 

left external iliac artery.  He is doing well, she is more awake on today's 

exam, her pulse ox is 95%, denies any difficulty breathing, lung sounds are 

clear, diminished at the bases, no rhonchi or wheezing, patient has had no fever

or chills, Galvin signs have been stable, bilateral groin incisions clean dry and

intact, yesterday's chest x-ray showed mild cardiac megaly, interstitial 

densities and trace pleural effusions probably related to mild CHF, fluid 

overload.  The patient has been stable,  cardiology has been following, 

echocardiogram was completed showing EF of 50-55%, mild aortic stenosis, 

moderate mitral regurgitation, moderate to severe tricuspid regurgitation 

moderate to severe pulmonary hypertension.  Patient has history of chronic 

persistent atrial fibrillation, he is not on any tach with exertion due to his 

history of GI bleeding.  Appears to be comfortable on today's exam, has no 

specific complaints, is hoping to be able to go home today








Objective





- Vital Signs


Vital signs: 


                                   Vital Signs











Temp  98.1 F   11/12/20 11:57


 


Pulse  94   11/12/20 11:57


 


Resp  20   11/12/20 11:57


 


BP  104/57   11/12/20 11:57


 


Pulse Ox  95   11/12/20 11:57








                                 Intake & Output











 11/11/20 11/12/20 11/12/20





 18:59 06:59 18:59


 


Intake Total 425  


 


Output Total 500 150 


 


Balance -75 -150 


 


Intake:   


 


    


 


    Sodium Chloride 0.9% 1, 375  





    000 ml @ 125 mls/hr IV .   





    Q8H ROBI Rx#:[id number]   


 


    ceFAZolin 1,000 mg In 50  





    Sodium Chloride 0.9% 50   





    ml @ 100 mls/hr IVPB Q8HR   





    ROBI Rx#:[id number]   


 


Output:   


 


  Urine 500 150 


 


Other:   


 


  Voiding Method Urinal Urinal Toilet


 


  # Voids 1 1 3


 


  # Bowel Movements   1








                       ABP, PAP, CO, CI - Last Documented











Arterial Blood Pressure        107/48

















- Exam


GENERAL EXAM: Drowsy but arousable, 80-year-old white male, a 5 L of oxygen with

pulse ox of 96%, comfortable in no apparent distress.


HEAD: Normocephalic/atraumatic.


EYES: Normal reaction of pupils, equal size.  Conjunctiva pink, sclera white.


NOSE: Clear with pink turbinates.


THROAT: No erythema or exudates.


NECK: No masses, no JVD, no thyroid enlargement, no adenopathy.


CHEST: No chest wall deformity.  Symmetrical expansion. 


LUNGS: Equal air entry with no crackles, wheeze, rhonchi or dullness.


CVS: Regular rate and rhythm, normal S1 and S2, no gallops, no murmurs, no rubs


ABDOMEN: Soft, nontender, obese.  No hepatosplenomegaly, normal bowel sounds, no

guarding or rigidity.


EXTREMITIES: No clubbing, no edema, no cyanosis, 2+ pulses and upper and lower 

extremities.  Bilateral groin incisions covered with surgical dressings, clean 

dry and intact,


MUSCULOSKELETAL: Muscle strength and tone normal.


SPINE: No scoliosis or deformity


SKIN: No rashes


CENTRAL NERVOUS SYSTEM: Drowsy and oriented -3.  No focal deficits, tone is 

normal in all 4 extremities.


PSYCHIATRIC: Drowsy and oriented -3.  Appropriate affect.  Intact judgment and 

insight.











- Labs


CBC & Chem 7: 


                                 11/11/20 03:13





                                 11/11/20 03:13


Labs: 


                  Abnormal Lab Results - Last 24 Hours (Table)











  11/09/20 11/11/20 Range/Units





  11:56 03:13 


 


Procalcitonin   0.38 H  (0.02-0.09)  ng/mL


 


Crossmatch  See Detail   








                      Microbiology - Last 24 Hours (Table)











 11/10/20 03:45 Urine Culture - Final





 Urine,Catheterized 














Assessment and Plan


Plan: 


 Assessment:





#1.  Acute hypoxic respiratory failure related to hypoventilation, and 

atelectasis





#2.  Acute ruptured infrarenal abdominal aortic aneurysm, patient presented with

right-sided abdominal pain, patient's status post urgent aortobiliac endograft, 

and stent placement to the left external iliac artery, postoperative day #3





#3.  Acute hypercapnic respiratory failure related to hypoventilation and 

atelectasis, requiring BiPAP support, improved, patient is currently on nasal 

cannula





#4.  Extensive history of smoking, patient carries greater than 50-pack-year 

smoking history, still smoking one to 2 packs per day





#5.  Hypertension





#6.  Hyperlipidemia





#7.  COPD not oxygen dependent at baseline





#8.  BPH





#9.  Chronic back pain





#10.  Psoriasis





#11.  Obstructive sleep apnea not on CPAP therapy





Plan:





Continue encouraging deep breathing and coughing, clinically patient has 

remained stable, no acute events overnight, he is on room air, denies any 

dyspnea, no cough or congestion, no specific complaints, denies chest x-ray has 

been reviewed showing interstitial densities and trace pleural effusions.  

Urinalysis suggest possibility of urinary tract infection, however urine culture

showed no growth, patient has received IV antibiotics while in the hospital, 

currently asymptomatic, vital signs stable no fever or chills.  He is stable for

discharge home today from pulmonary perspective


I performed a history & physical examination of the patient and discussed their 

management with my nurse practitioner, Jesenia Walden.  I reviewed the nurse 

practitioner's note and agree with the documented findings and plan of care.  

Lung sounds are positive for diminished breath sounds.  The findings and the 

impression was discussed with the patient.  I attest to the documentation by the

nurse practitioner. 





Time with Patient: Less than 30

## 2020-11-12 NOTE — P.PN
Progress Note - Text


Progress Note Date: 11/12/20





Pt s/e. No complaints, doing well. No issues. No pain. No CP/SOB, decreased 

appetite. 





okay to go home from my [facility], if okay with consultants. Eval for possible 

home o2 with ambulation. follow up with Dr Solitario in 2 weeks

## 2020-11-13 NOTE — CDI
Documentation Clarification Form



Date: 11/13/20

From: Priscilla Doshi

Phone: If you have a question about this query, please contact Blanche Eddy 
 at [phone] between 8am and 5pm.

Admit Date: 

Discharge Date: 

Patient Name: 

Visit Number:   



ATTENTION: The Clinical Documentation Specialists (CDI) and [facility] Coding Staff 
appreciate your assistance in clarifying documentation. Please respond to the 
clarification below the line at the bottom and electronically sign. The CDI & 
[facility] Coding staff will review the response and follow-up if needed. Please note: 
Queries are made part of the Legal Health Record. If you have any questions, 
please contact the author of this message via ITS.



Dear Dr. Saeed



CKD is documented in the past medical history of Dr. Granados's 11/10 progress 
note, your consult note and progress notes and the discharge summary.



History/Risk Factors: Hypertension, pulmonary hypertension, atrial fib, 
hyperlipidemia

     Patients Historical BUN/CR/GFR 

           BUN:  4/23/19 - 22, 4/24/19 - 20, 

           Creatinine:  4/23/19 - 1.18, 4/24/20 - 1.24  

           GFR:  4/23/19 - 59, 4/24/19 - 56

      Clinical Indicators: Decreased GFR

      Current BUN/CR/GFR:  BUN:  11/9/20 - 19, 11/9/20 - 16, 11/10/20 - 20, 
11/11/20 - 24

              Creatinine:  11/9/20 - 1.00, 11/9/20 - 0.91, 11/10/20 - 1.21, 
11/11/20 - 1.23

              GFR:  11/9/20 - 71, 11/9/20 - 80, 11/10/20 - 57, 11/11/20 - 56

Treatment:  Atenolol 25 mg PO BID, Lipitor 80 MG 



In order to capture the severity of condition, please clarify the stage of the 
CKD, if known:



CKD Stage 1 (GFR > 90)

CKD Stage 2 (GFR 60-89)

CKD Stage 3 (GFR 30-59)

CKD Stage 4 (GFR 15-29)

CKD Stage 5 (GFR <15)

ESRD

Other, please specify  ___________

Unable to determine

___________________________________________________________________________

Stage 3

MTDD

## 2020-11-18 ENCOUNTER — HOSPITAL ENCOUNTER (OUTPATIENT)
Dept: HOSPITAL 47 - RADXRMAIN | Age: 80
Discharge: HOME | End: 2020-11-18
Attending: SURGERY
Payer: MEDICARE

## 2020-11-18 DIAGNOSIS — R10.84: ICD-10-CM

## 2020-11-18 DIAGNOSIS — R14.0: Primary | ICD-10-CM

## 2020-11-18 LAB
ALBUMIN SERPL-MCNC: 3.2 G/DL (ref 3.5–5)
ALP SERPL-CCNC: 96 U/L (ref 38–126)
ALT SERPL-CCNC: 14 U/L (ref 4–49)
ANION GAP SERPL CALC-SCNC: 5 MMOL/L
AST SERPL-CCNC: 22 U/L (ref 17–59)
BUN SERPL-SCNC: 15 MG/DL (ref 9–20)
CALCIUM SPEC-MCNC: 8.6 MG/DL (ref 8.4–10.2)
CHLORIDE SERPL-SCNC: 102 MMOL/L (ref 98–107)
CO2 SERPL-SCNC: 31 MMOL/L (ref 22–30)
ERYTHROCYTE [DISTWIDTH] IN BLOOD BY AUTOMATED COUNT: 3.49 M/UL (ref 4.3–5.9)
ERYTHROCYTE [DISTWIDTH] IN BLOOD: 15.6 % (ref 11.5–15.5)
GLUCOSE SERPL-MCNC: 106 MG/DL (ref 74–99)
HCT VFR BLD AUTO: 31.5 % (ref 39–53)
HGB BLD-MCNC: 9.8 GM/DL (ref 13–17.5)
MCH RBC QN AUTO: 27.9 PG (ref 25–35)
MCHC RBC AUTO-ENTMCNC: 31 G/DL (ref 31–37)
MCV RBC AUTO: 90.1 FL (ref 80–100)
PLATELET # BLD AUTO: 257 K/UL (ref 150–450)
POTASSIUM SERPL-SCNC: 3.9 MMOL/L (ref 3.5–5.1)
PROT SERPL-MCNC: 6.2 G/DL (ref 6.3–8.2)
SODIUM SERPL-SCNC: 138 MMOL/L (ref 137–145)
WBC # BLD AUTO: 11 K/UL (ref 3.8–10.6)

## 2020-11-18 PROCEDURE — 80053 COMPREHEN METABOLIC PANEL: CPT

## 2020-11-18 PROCEDURE — 74019 RADEX ABDOMEN 2 VIEWS: CPT

## 2020-11-18 PROCEDURE — 85027 COMPLETE CBC AUTOMATED: CPT

## 2020-11-18 PROCEDURE — 36415 COLL VENOUS BLD VENIPUNCTURE: CPT

## 2020-11-18 NOTE — XR
EXAMINATION TYPE: XR abdomen 2V

 

DATE OF EXAM: 11/18/2020

 

COMPARISON: NONE

 

HISTORY: Pain

 

TECHNIQUE: Single supine KUB image of the abdomen is obtained

 

FINDINGS:  

Small bowel demonstrates no evidence for dilatation or air fluid levels.  

 

Gas and fecal material is seen in non-distended colon.  

 

No convincing evidence for pneumoperitoneum.

 

 No unusual calcifications. 

 

The lung bases are clear. 

 

The osseous structures are intact.

 

IMPRESSION:  

 

1.  Overall nonobstructive bowel gas pattern.

## 2020-12-03 ENCOUNTER — HOSPITAL ENCOUNTER (OUTPATIENT)
Dept: HOSPITAL 47 - RADCTMAIN | Age: 80
Discharge: HOME | End: 2020-12-03
Attending: SURGERY
Payer: MEDICARE

## 2020-12-03 DIAGNOSIS — Z95.828: ICD-10-CM

## 2020-12-03 DIAGNOSIS — R59.9: Primary | ICD-10-CM

## 2020-12-03 LAB — BUN SERPL-SCNC: 19 MG/DL (ref 9–20)

## 2020-12-03 PROCEDURE — 82565 ASSAY OF CREATININE: CPT

## 2020-12-03 PROCEDURE — 84520 ASSAY OF UREA NITROGEN: CPT

## 2020-12-03 PROCEDURE — 74174 CTA ABD&PLVS W/CONTRAST: CPT

## 2020-12-03 PROCEDURE — 36415 COLL VENOUS BLD VENIPUNCTURE: CPT

## 2020-12-03 NOTE — CT
EXAMINATION TYPE: CT angio abdomen pelvis

 

DATE OF EXAM: 12/3/2020

 

COMPARISON:

 

HISTORY: AAA F/u

 

CT DLP: 1376.2 mGycm, Automated Exposure Control for Dose Reduction was Utilized.

 

CONTRAST: 

CTA scan of the abdomen and pelvis is performed without oral and without and with IV Contrast, patien
t injected with 80 mL of Isovue 370. Stent graft protocol. Three-D reconstructed images created on a 
independent workstation and reviewed.

 

FINDINGS: 

 

VASCULAR: There is new stent graft beginning at level of celiac artery nonmetallic portion with metal
lic portion beginning near SMA origin extending to distal common iliac arteries bilaterally. There is
 new metallic stent in the proximal left external iliac artery. There is large AAA redemonstrated. Th
e native aneurysm measures up to 7.5 cm transversely axial image 41 stable in size from prior study. 
There is persistent outpouching along the posterior right margin image 41 similar to prior study. Raza
gth of the aneurysm roughly 12 cm coronal image 17. Persistent irregular hyperdensity and calcificati
on involving the anterior aspect of the infrarenal AAA not significantly changed from prior. Some mil
d ill-defined fluid and fat stranding surrounding aorta is significantly improved from prior study. D
ynamic postcontrast images show patency of the stent graft without definitive extraluminal enhancemen
t to suggest endoleak. 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
y aneurysm stable in size from prior.

 

LUNG BASES: Persistent cardiomegaly.

 

LIVER/GB: Two Large gallstones redemonstrated dependently in the gallbladder.

 

PANCREAS:  No significant abnormality is seen.

 

SPLEEN:  No significant abnormality is seen.

 

ADRENALS:  No significant abnormality is seen.

 

KIDNEYS: Redemonstration of several nonobstructing renal calculi bilaterally. There are scattered sim
ple-appearing thin-walled cysts bilaterally redemonstrated with largest adjacent cysts or lobulated c
yst lower pole left kidney. No hydronephrosis seen bilaterally.

 

BOWEL: Diverticula throughout the left and sigmoid colon and the right colon. No CT evidence for acut
e diverticulitis.

 

PROSTATE/SEMINAL VESICLES: Enlarged prostate gland consistent with BPH redemonstrated.

 

LYMPH NODES:  No new greater than 1cm abdominal or pelvic lymph nodes are appreciated. In retrospect 
stable slightly enlarged 1.7 x 1.3 cm left iliac chain lymph node axial image 60 series 7. This is st
able from August 2018 and thus can be presumed benign.

 

OSSEOUS STRUCTURES: Metallic artificial left hip arthroplasty causes streak artifact limiting evaluat
ion of pelvic structures. Moderate 2 borderline severe axial joint space loss and spurring right hip 
redemonstrated. Moderate-to-severe multilevel spurring in the spine. Moderate to severe disc space na
rrowing lumbosacral junction redemonstrated. Prominent facet arthropathy lower lumbar levels. Prior p
artial spinous process resection redemonstrated.

 

OTHER: New ill-defined fluid and fat stranding left groin region likely reflects resolving postoperat
julian hematoma from interval repair of the unstable AAA.

 

IMPRESSION: New stent graft with successful patency. No CTA evidence for endoleak. Enlarged left pelv
ic lymph node.